# Patient Record
Sex: FEMALE | Race: WHITE | Employment: OTHER | URBAN - METROPOLITAN AREA
[De-identification: names, ages, dates, MRNs, and addresses within clinical notes are randomized per-mention and may not be internally consistent; named-entity substitution may affect disease eponyms.]

---

## 2019-04-09 ENCOUNTER — TELEPHONE (OUTPATIENT)
Dept: SURGERY | Age: 49
End: 2019-04-09

## 2019-04-10 ENCOUNTER — HOSPITAL ENCOUNTER (OUTPATIENT)
Dept: ULTRASOUND IMAGING | Age: 49
Discharge: HOME | End: 2019-04-10
Attending: SURGERY
Payer: COMMERCIAL

## 2019-04-10 ENCOUNTER — HOSPITAL ENCOUNTER (OUTPATIENT)
Dept: MAMMOGRAPHY | Age: 49
Discharge: HOME | End: 2019-04-10
Attending: SURGERY
Payer: COMMERCIAL

## 2019-04-10 ENCOUNTER — OFFICE VISIT (OUTPATIENT)
Dept: SURGERY | Age: 49
End: 2019-04-10

## 2019-04-10 VITALS
RESPIRATION RATE: 16 BRPM | TEMPERATURE: 98 F | SYSTOLIC BLOOD PRESSURE: 107 MMHG | HEART RATE: 77 BPM | DIASTOLIC BLOOD PRESSURE: 75 MMHG | HEIGHT: 64 IN | WEIGHT: 146 LBS | BODY MASS INDEX: 24.92 KG/M2 | OXYGEN SATURATION: 96 %

## 2019-04-10 DIAGNOSIS — N64.52 NIPPLE DISCHARGE: ICD-10-CM

## 2019-04-10 DIAGNOSIS — N64.52 BLOODY DISCHARGE FROM LEFT NIPPLE: Primary | ICD-10-CM

## 2019-04-10 PROCEDURE — 77061 BREAST TOMOSYNTHESIS UNI: CPT

## 2019-04-10 PROCEDURE — 76642 ULTRASOUND BREAST LIMITED: CPT

## 2019-04-10 NOTE — H&P (VIEW-ONLY)
Nipple Discharge Consult Ms. Gm Roger is a 53year old woman who was referred for left nipple discharge. She initially noticed the discharge August 2018. She reports the discharge is bloody. She reports  Spontaneous and induced. It is often brownish, though recently reports it appeared to be chas blood. It happens daily. She denies excessive breast pain, though does report discomfort with her menstrual cycle. She denies history of similar symptoms previously. She is not currently nursing. Her most recent imaging was August 2018 at [address] [place] [address]. Breast/GYN history:   
OB History None Past Medical History:  
Diagnosis Date  Kidney stone Past Surgical History:  
Procedure Laterality Date  HX GYN    
 perineal repair  HX ORTHOPAEDIC    
 left index;plastic surgery Current Outpatient Medications on File Prior to Visit Medication Sig Dispense Refill  
 HYDROcodone-acetaminophen (LORTAB) 5-500 mg per tablet  tamsulosin (FLOMAX) 0.4 mg capsule  oxyCODONE-acetaminophen (PERCOCET) 5-325 mg per tablet Take 1-2 Tabs by mouth every six (6) hours as needed for Pain (Severe). 30 Tab 0  
 norgestimate-ethinyl estradiol (ORTHO TRI-CYCLEN, 28,) 0.18/0.215/0.25 mg-35 mcg (28) tablet Take  by mouth. No current facility-administered medications on file prior to visit. Allergies Allergen Reactions  Hydrocodone Itching Unable to sleep; itchy  Pcn [Penicillins] Rash  
  fever Social History Tobacco Use  Smoking status: Never Smoker  Smokeless tobacco: Never Used Substance Use Topics  Alcohol use: No  
 Drug use: No  
 
 
Family History Problem Relation Age of Onset [facility] Arthritis-osteo Mother  Arthritis-osteo Maternal Grandmother  Diabetes Maternal Grandmother  Cancer Maternal Grandmother   
     breast  
 Breast Cancer Other ROS:  
 General: denies fevers, chills, night sweats, fatigue, weight loss, weight gain GI: denies nausea, vomiting, abdominal pain, change in bowel habits, hematochezia, melena, GERD Integ: denies dermatitis, abnormal moles HEENT: denies visual changes, vertigo, epistaxis, dysphagia, hoarseness Cardiac: denies chest pain, palpitations, HTN, edema, varicosities Resp: denies cough, shortness of breath, wheezing, hemoptysis, snoring, reactive airway disease : denies hematuria, dysuria, frequency, urgency, nocturia, stress urinary incontinence MSK: weakness, joint pain, arthritis Endocrine: denies diabetes, thyroid disease, polyuria, polydipsia, polyphagia, poor wound healing, heat intolerance, cold intolerance Lymph/Heme: denies anemia, bruising, history of blood transfusions Neuro: denies dizziness, headache, fainting, seizures, stroke Psych: denies anxiety, depression Physical Exam: 
Visit Vitals /75 (BP 1 Location: Right arm, BP Patient Position: Sitting) Pulse 77 Temp 98 °F (36.7 °C) (Oral) Resp 16 Ht 5' 4\" (1.626 m) Wt 66.2 kg (146 lb) LMP 04/06/2019 (Exact Date) SpO2 96% BMI 25.06 kg/m² Gen: Well developed, well nourished woman in no acute distress Head: normocephalic, atraumatic Mouth: Clear, no overt lesions, oral mucosa pink and moist 
Neck: supple, no masses, no adenopathy, trachea midline Resp: clear bilaterally Cardio: Regular rate and rhythm Abdomen: soft, nontender, nondistended Extremeties: warm, well-perfused Neuro: sensation and strength grossly intact and symmetrical 
Psych: alert and oriented to person, place and time Breasts:  
Right: Examined in both the supine and upright positions. There was no supraclavicular, infraclavicular, or axillary lympadenopathy. There were no dominant masses, no skin changes, no asymmetry identified Left: Examined in both the supine and upright positions.   There was no supraclavicular, infraclavicular, or axillary lympadenopathy. There were no dominant masses, no skin changes, no asymmetry identified  Induced brown nipple discharge from central duct Imaging: 
August 2018 [facility], negative per patient Impression: 
Patient Active Problem List  
Diagnosis Code  Bloody discharge from left nipple N64.52  
 
 
 53year old woman with left bloody nipple discharge. I have recommended mammogram and possible ultrasound to evaluate. We discussed likely this represents an intraductal papilloma, however I have recommended proceeding with imaging as a fist step. Pending results, image guided biopsy may be recommended. If imaging is negative, we may choose to proceed with retroareolar duct excision for both diagnostic and therapeutic purposes. Ms. Estrada Sim understands and agrees with this plan. Follow up 1 week. Please call sooner with questions or concerns.

## 2019-04-10 NOTE — PROGRESS NOTES
Nipple Discharge Consult    Ms. Gretel Corbett is a 53year old woman who was referred for left nipple discharge. She initially noticed the discharge August 2018. She reports the discharge is bloody. She reports  Spontaneous and induced. It is often brownish, though recently reports it appeared to be chas blood. It happens daily. She denies excessive breast pain, though does report discomfort with her menstrual cycle. She denies history of similar symptoms previously. She is not currently nursing. Her most recent imaging was August 2018 at [address] [place] [address]. Breast/GYN history:    OB History    None          Past Medical History:   Diagnosis Date    Kidney stone        Past Surgical History:   Procedure Laterality Date    HX GYN      perineal repair    HX ORTHOPAEDIC      left index;plastic surgery       Current Outpatient Medications on File Prior to Visit   Medication Sig Dispense Refill    HYDROcodone-acetaminophen (LORTAB) 5-500 mg per tablet       tamsulosin (FLOMAX) 0.4 mg capsule       oxyCODONE-acetaminophen (PERCOCET) 5-325 mg per tablet Take 1-2 Tabs by mouth every six (6) hours as needed for Pain (Severe). 30 Tab 0    norgestimate-ethinyl estradiol (ORTHO TRI-CYCLEN, 28,) 0.18/0.215/0.25 mg-35 mcg (28) tablet Take  by mouth. No current facility-administered medications on file prior to visit.         Allergies   Allergen Reactions    Hydrocodone Itching     Unable to sleep; itchy    Pcn [Penicillins] Rash     fever       Social History     Tobacco Use    Smoking status: Never Smoker    Smokeless tobacco: Never Used   Substance Use Topics    Alcohol use: No    Drug use: No       Family History   Problem Relation Age of Onset    Arthritis-osteo Mother     Arthritis-osteo Maternal Grandmother     Diabetes Maternal Grandmother     Cancer Maternal Grandmother         breast    Breast Cancer Other          ROS:   General: denies fevers, chills, night sweats, fatigue, weight loss, weight gain  GI: denies nausea, vomiting, abdominal pain, change in bowel habits, hematochezia, melena, GERD  Integ: denies dermatitis, abnormal moles  HEENT: denies visual changes, vertigo, epistaxis, dysphagia, hoarseness  Cardiac: denies chest pain, palpitations, HTN, edema, varicosities  Resp: denies cough, shortness of breath, wheezing, hemoptysis, snoring, reactive airway disease  : denies hematuria, dysuria, frequency, urgency, nocturia, stress urinary incontinence MSK: weakness, joint pain, arthritis  Endocrine: denies diabetes, thyroid disease, polyuria, polydipsia, polyphagia, poor wound healing, heat intolerance, cold intolerance  Lymph/Heme: denies anemia, bruising, history of blood transfusions  Neuro: denies dizziness, headache, fainting, seizures, stroke  Psych: denies anxiety, depression    Physical Exam:  Visit Vitals  /75 (BP 1 Location: Right arm, BP Patient Position: Sitting)   Pulse 77   Temp 98 °F (36.7 °C) (Oral)   Resp 16   Ht 5' 4\" (1.626 m)   Wt 66.2 kg (146 lb)   LMP 04/06/2019 (Exact Date)   SpO2 96%   BMI 25.06 kg/m²       Gen: Well developed, well nourished woman in no acute distress  Head: normocephalic, atraumatic  Mouth: Clear, no overt lesions, oral mucosa pink and moist  Neck: supple, no masses, no adenopathy, trachea midline  Resp: clear bilaterally  Cardio: Regular rate and rhythm  Abdomen: soft, nontender, nondistended  Extremeties: warm, well-perfused  Neuro: sensation and strength grossly intact and symmetrical  Psych: alert and oriented to person, place and time  Breasts:   Right: Examined in both the supine and upright positions. There was no supraclavicular, infraclavicular, or axillary lympadenopathy. There were no dominant masses, no skin changes, no asymmetry identified   Left: Examined in both the supine and upright positions. There was no supraclavicular, infraclavicular, or axillary lympadenopathy.    There were no dominant masses, no skin changes, no asymmetry identified  Induced brown nipple discharge from central duct        Imaging:  August 2018 [facility], negative per patient    Impression:  Patient Active Problem List   Diagnosis Code    Bloody discharge from left nipple N64.52        53year old woman with left bloody nipple discharge. I have recommended mammogram and possible ultrasound to evaluate. We discussed likely this represents an intraductal papilloma, however I have recommended proceeding with imaging as a fist step. Pending results, image guided biopsy may be recommended. If imaging is negative, we may choose to proceed with retroareolar duct excision for both diagnostic and therapeutic purposes. Ms. Sarthak Ridley understands and agrees with this plan. Follow up 1 week. Please call sooner with questions or concerns.

## 2019-04-10 NOTE — LETTER
4/10/2019 2:33 PM 
 
Patient:  Richadr Stuart YOB: 1970 Date of Visit: 4/10/2019 Cady Coto MD 
[address] [facility] Internal Medicine [place] [id number] VIA Facsimile: [phone] Edgard Horan MD 
[address] [facility] [place] [address] VIA Facsimile: [phone] Dear MD Edgard Felipe MD, 
 
 
I had the pleasure of seeing Ms. Imer Rivera in my office today for her bloody nipple discharge. I am including a copy of my office visit today. If you have questions, please do not hesitate to call me. I look forward to following Ms. Nicol Walton along with you and will keep you updated as to her progress. Sincerely, Tamir Finn MD

## 2019-04-10 NOTE — PATIENT INSTRUCTIONS
If you have any questions or concerns about today's appointment, the verbal and/or written instructions you were given for follow up care, please call our office at [phone].     [facility] - [address]    [phone] office  [phone]

## 2019-04-11 ENCOUNTER — TELEPHONE (OUTPATIENT)
Dept: SURGERY | Age: 49
End: 2019-04-11

## 2019-04-11 NOTE — TELEPHONE ENCOUNTER
Per Dr. Amanuel Pierce spoke with Mrs. Flavio Cisneros to inform there was no obvious abnormality to explain her nipple discharge on imaging. If she would like to proceed with left retroareolar duct excision to ensure there is nothing obscured by the nipple and most likely stop the bleeding, we may do so. Otherwise she may also discuss the results and treatment plan in further detail with Dr. Amanuel Pierce at her follow up appointment on April 17, 2019. Mrs. Flavio Cisneros states she will discuss with her  and call back because of her kids soccer schedule she'll need to make some arrangements.

## 2019-04-24 ENCOUNTER — OFFICE VISIT (OUTPATIENT)
Dept: VASCULAR SURGERY | Age: 49
End: 2019-04-24

## 2019-04-24 VITALS
SYSTOLIC BLOOD PRESSURE: 104 MMHG | HEART RATE: 60 BPM | BODY MASS INDEX: 24.92 KG/M2 | WEIGHT: 146 LBS | HEIGHT: 64 IN | DIASTOLIC BLOOD PRESSURE: 60 MMHG | RESPIRATION RATE: 16 BRPM

## 2019-04-24 DIAGNOSIS — I83.92 SPIDER VEIN OF LEFT LOWER EXTREMITY: ICD-10-CM

## 2019-04-24 DIAGNOSIS — I87.2 CHRONIC VENOUS INSUFFICIENCY: Primary | ICD-10-CM

## 2019-04-24 NOTE — PROGRESS NOTES
[address] [place] [address]    Chief Complaint   Patient presents with    New Patient    Swelling       HPI    [address] [place] [address] is a 53 y.o. female with previous chronic venous insufficiency that was found 6 years ago within the femoral vein on the left lower extremity. Patient has been on off and on compression socks and stockings. This has allowed her to tolerate some of her issues. But her swelling and discomfort has continued to worsen over time. She now has pretty severe venous claudication at the end of the day with worsening edema at the end of the day. This is improved when she does wear her stockings at times but is unable to wear them all the time. She does have a small spider vein which is visually unappealing but does not cause her any pain or discomfort. No history of DVT or PE. No trauma history. She does have some groin pressure and discomfort which can go into the left lower quadrant of her abdomen. But no pelvic pain or discomfort. She has had 6 pregnancies and 3 to fruition. Her maternal grandmother had very bad chronic venous insufficiency with DVT.   She does not complain of any sexual discomfort    Past Medical History:   Diagnosis Date    Kidney stone     Spider vein of left lower extremity 4/24/2019     Patient Active Problem List   Diagnosis Code    Bloody discharge from left nipple N64.52    Chronic venous insufficiency I87.2    Spider vein of left lower extremity I83.92     Past Surgical History:   Procedure Laterality Date    HX GYN      perineal repair    HX ORTHOPAEDIC      left index;plastic surgery       Allergies   Allergen Reactions    Hydrocodone Itching     Unable to sleep; itchy    Pcn [Penicillins] Rash     fever     Social History     Socioeconomic History    Marital status:      Spouse name: Not on file    Number of children: Not on file    Years of education: Not on file    Highest education level: Not on file   Occupational History    Not on file Social Needs    Financial resource strain: Not on file    Food insecurity:     Worry: Not on file     Inability: Not on file    Transportation needs:     Medical: Not on file     Non-medical: Not on file   Tobacco Use    Smoking status: Never Smoker    Smokeless tobacco: Never Used   Substance and Sexual Activity    Alcohol use: No    Drug use: No    Sexual activity: Yes     Partners: Male     Birth control/protection: Pill   Lifestyle    Physical activity:     Days per week: Not on file     Minutes per session: Not on file    Stress: Not on file   Relationships    Social connections:     Talks on phone: Not on file     Gets together: Not on file     Attends [employer] service: Not on file     Active member of club or organization: Not on file     Attends meetings of clubs or organizations: Not on file     Relationship status: Not on file    Intimate partner violence:     Fear of current or ex partner: Not on file     Emotionally abused: Not on file     Physically abused: Not on file     Forced sexual activity: Not on file   Other Topics Concern    Not on file   Social History Narrative    Not on file      Family History   Problem Relation Age of Onset    Arthritis-osteo Mother     Arthritis-osteo Maternal Grandmother     Diabetes Maternal Grandmother     Cancer Maternal Grandmother         breast    Breast Cancer Other        Review of Systems    Constitutional: negative  Eyes: negative  Ears, nose, mouth, throat, and face: negative  Respiratory: negative  Cardiovascular: negative  Gastrointestinal: negative  Genitourinary:negative  Hematologic/lymphatic: negative  Musculoskeletal:negative  Neurological: negative  Behavioral/Psych: negative  Endocrine: negative  Allergic/Immunologic: negative  Unless otherwise mentioned in the HPI.     Physical Exam:    Visit Vitals  /60 (BP 1 Location: Left arm, BP Patient Position: Sitting)   Pulse 60   Resp 16   Ht 5' 4\" (1.626 m)   Wt 146 lb (66.2 kg) LMP 04/06/2019 (Exact Date)   BMI 25.06 kg/m²      General: Well-appearing female in no acute distress  HEENT: EOMI no scleral icterus is noted moist mucous membranes noted  Pulmonary: No increased work of breathing is noted clear to auscultation bilaterally no wheezes rales or rhonchi  Cardiovascular: Regular rate rhythm normal S1-S2 no rubs murmurs or gallops no carotid bruits are heard bilaterally  Abdomen: Soft nontender nondistended no rebound guarding is noted no palpable pulsatile abdominal mass can be felt  Extremities: Warm and well-perfused bilaterally she does have trace edema of the left lower extremity there is a small spider vein on the left lateral proximal calf. She has no evidence of ulcerations or skin changes. Neuro: Cranial nerves II through XII are grossly intact normal gait    Impression and Plan:  Yasmin Humphreys is a 53 y.o. female with class III chronic venous insufficiency of the left lower extremity with a asymptomatic spider vein. We talked about sclerotherapy for spider vein and pricing. Also talked about having to wear 20 to 30 mmHg compression socks all day every day. We will do this medical therapy for 3 months as well as NSAID S medication and horse chestnut seed extract. I will see her back in 3 months with a venous insufficiency scan. Depending on what this shows will depend on if she needs an ablation or venogram for any possible may Thurner syndrome. Further plan pending the venous insufficiency ultrasound. We reviewed the plan with the patient and the patient understands. We also gave the patient appropriate instructions on their disease process and when to call back. Greater than 50% of this visit was spent with face to face discussion. Follow-up and Dispositions    · Return in about 3 months (around 7/24/2019). Natalie Britton MD    PLEASE NOTE:  This document has been produced using voice recognition software.  Unrecognized errors in transcription may be present.

## 2019-04-24 NOTE — LETTER
4/24/19 Patient: Cody Barker YOB: 1970 Date of Visit: 4/24/2019 Vonnie Mclean MD 
[address] Internal Medicine [facility] [address] [id number] VIA Facsimile: [phone] Dear Vonnie Mclean MD, Thank you for referring Ms. Demetria Arthur to [facility] VEIN/VASCULAR SPEC-PORTS for evaluation. My notes for this consultation are attached. If you have questions, please do not hesitate to call me. I look forward to following your patient along with you. Sincerely, Shana Aguilera MD

## 2019-04-29 ENCOUNTER — ANESTHESIA EVENT (OUTPATIENT)
Dept: SURGERY | Age: 49
End: 2019-04-29
Payer: COMMERCIAL

## 2019-04-29 RX ORDER — BISMUTH SUBSALICYLATE 262 MG
1 TABLET,CHEWABLE ORAL DAILY
COMMUNITY

## 2019-04-29 NOTE — PERIOP NOTES
PAT - SURGICAL PRE-ADMISSION INSTRUCTIONS    NAME:  Yasmin Humphreys                                                          TODAY'S DATE:  4/29/2019    SURGERY DATE:  4/30/2019                                  SURGERY ARRIVAL TIME:   TBV    1. Do NOT eat or drink anything, including candy or gum, after MIDNIGHT on 04/29/2019 , unless you have specific instructions from your Surgeon or Anesthesia Provider to do so. 2. No smoking on the day of surgery. 3. No alcohol 24 hours prior to the day of surgery. 4. No recreational drugs for one week prior to the day of surgery. 5. Leave all valuables, including money/purse, at home. 6. Remove all jewelry, nail polish, makeup (including mascara); no lotions, powders, deodorant, or perfume/cologne/after shave. 7. Glasses/Contact lenses and Dentures may be worn to the hospital.  They will be removed prior to surgery. 8. Call your doctor if symptoms of a cold or illness develop within 24 ours prior to surgery. 9. AN ADULT MUST DRIVE YOU HOME AFTER OUTPATIENT SURGERY. 10. If you are having an OUTPATIENT procedure, please make arrangements for a responsible adult to be with you for 24 hours after your surgery. 11. If you are admitted to the hospital, you will be assigned to a bed after surgery is complete. Normally a family member will not be able to see you until you are in your assigned bed. 15. Family is encouraged to accompany you to the hospital.  We ask visitors in the treatment area to be limited to ONE person at a time to ensure patient privacy. EXCEPTIONS WILL BE MADE AS NEEDED. 15. Children under 12 are discouraged from entering the treatment area and need to be supervised by an adult when in the waiting room. Special Instructions:    NONE. Patient Prep:    shower with anti-bacterial soap    These surgical instructions were reviewed with Facundo Baker during the PAT phone call. Directions:   On the morning of surgery, please go to the [facility]. Enter the building from the [facility] entrance, 1st floor (next to the Emergency Room entrance). Take the elevator to the 2nd floor. Sign in at the Registration Desk.     If you have any questions and/or concerns, please do not hesitate to call:  (Prior to the day of surgery)  [facility] unit:  [phone]  (Day of surgery)  [facility] unit:  [phone]

## 2019-04-30 ENCOUNTER — HOSPITAL ENCOUNTER (OUTPATIENT)
Age: 49
Setting detail: OUTPATIENT SURGERY
Discharge: HOME | End: 2019-04-30
Attending: SURGERY | Admitting: SURGERY
Payer: COMMERCIAL

## 2019-04-30 ENCOUNTER — ANESTHESIA (OUTPATIENT)
Dept: SURGERY | Age: 49
End: 2019-04-30
Payer: COMMERCIAL

## 2019-04-30 VITALS
SYSTOLIC BLOOD PRESSURE: 98 MMHG | RESPIRATION RATE: 16 BRPM | WEIGHT: 146 LBS | DIASTOLIC BLOOD PRESSURE: 56 MMHG | BODY MASS INDEX: 24.32 KG/M2 | OXYGEN SATURATION: 100 % | HEART RATE: 64 BPM | HEIGHT: 65 IN | TEMPERATURE: 97.1 F

## 2019-04-30 DIAGNOSIS — N64.52 BLOODY DISCHARGE FROM LEFT NIPPLE: Primary | ICD-10-CM

## 2019-04-30 LAB — HCG UR QL: NEGATIVE

## 2019-04-30 PROCEDURE — 77030031139 HC SUT VCRL2 J&J -A: Performed by: SURGERY

## 2019-04-30 PROCEDURE — 88305 TISSUE EXAM BY PATHOLOGIST: CPT

## 2019-04-30 PROCEDURE — 77030010516 HC APPL HEMA CLP TELE -B: Performed by: SURGERY

## 2019-04-30 PROCEDURE — 76210000021 HC REC RM PH II 0.5 TO 1 HR: Performed by: SURGERY

## 2019-04-30 PROCEDURE — 74011250636 HC RX REV CODE- 250/636

## 2019-04-30 PROCEDURE — 77030037400 HC ADH TISS HI VISC EXOFIN CHMP -B: Performed by: SURGERY

## 2019-04-30 PROCEDURE — 77030013079 HC BLNKT BAIR HGGR 3M -A: Performed by: ANESTHESIOLOGY

## 2019-04-30 PROCEDURE — 76210000006 HC OR PH I REC 0.5 TO 1 HR: Performed by: SURGERY

## 2019-04-30 PROCEDURE — 88307 TISSUE EXAM BY PATHOLOGIST: CPT

## 2019-04-30 PROCEDURE — 81025 URINE PREGNANCY TEST: CPT

## 2019-04-30 PROCEDURE — 77030032490 HC SLV COMPR SCD KNE COVD -B: Performed by: SURGERY

## 2019-04-30 PROCEDURE — 77030018836 HC SOL IRR NACL ICUM -A: Performed by: SURGERY

## 2019-04-30 PROCEDURE — 74011000250 HC RX REV CODE- 250: Performed by: SURGERY

## 2019-04-30 PROCEDURE — 74011250636 HC RX REV CODE- 250/636: Performed by: NURSE ANESTHETIST, CERTIFIED REGISTERED

## 2019-04-30 PROCEDURE — 76060000032 HC ANESTHESIA 0.5 TO 1 HR: Performed by: SURGERY

## 2019-04-30 PROCEDURE — 77030011265 HC ELECTRD BLD HEX COVD -A: Performed by: SURGERY

## 2019-04-30 PROCEDURE — 77030012510 HC MSK AIRWY LMA TELE -B: Performed by: ANESTHESIOLOGY

## 2019-04-30 PROCEDURE — 74011250637 HC RX REV CODE- 250/637: Performed by: NURSE ANESTHETIST, CERTIFIED REGISTERED

## 2019-04-30 PROCEDURE — 76010000160 HC OR TIME 0.5 TO 1 HR INTENSV-TIER 1: Performed by: SURGERY

## 2019-04-30 PROCEDURE — 77030002996 HC SUT SLK J&J -A: Performed by: SURGERY

## 2019-04-30 PROCEDURE — 77030002933 HC SUT MCRYL J&J -A: Performed by: SURGERY

## 2019-04-30 PROCEDURE — 74011250636 HC RX REV CODE- 250/636: Performed by: SURGERY

## 2019-04-30 PROCEDURE — 77030010938 HC CLP LIG TELE -A: Performed by: SURGERY

## 2019-04-30 RX ORDER — HYDROMORPHONE HYDROCHLORIDE 2 MG/ML
0.5 INJECTION, SOLUTION INTRAMUSCULAR; INTRAVENOUS; SUBCUTANEOUS
Status: DISCONTINUED | OUTPATIENT
Start: 2019-04-30 | End: 2019-04-30 | Stop reason: HOSPADM

## 2019-04-30 RX ORDER — FENTANYL CITRATE 50 UG/ML
INJECTION, SOLUTION INTRAMUSCULAR; INTRAVENOUS AS NEEDED
Status: DISCONTINUED | OUTPATIENT
Start: 2019-04-30 | End: 2019-04-30 | Stop reason: HOSPADM

## 2019-04-30 RX ORDER — LIDOCAINE HYDROCHLORIDE 10 MG/ML
0.1 INJECTION, SOLUTION EPIDURAL; INFILTRATION; INTRACAUDAL; PERINEURAL AS NEEDED
Status: DISCONTINUED | OUTPATIENT
Start: 2019-04-30 | End: 2019-04-30 | Stop reason: HOSPADM

## 2019-04-30 RX ORDER — SODIUM CHLORIDE 0.9 % (FLUSH) 0.9 %
5-40 SYRINGE (ML) INJECTION EVERY 8 HOURS
Status: DISCONTINUED | OUTPATIENT
Start: 2019-04-30 | End: 2019-04-30 | Stop reason: HOSPADM

## 2019-04-30 RX ORDER — LIDOCAINE HYDROCHLORIDE 20 MG/ML
INJECTION, SOLUTION EPIDURAL; INFILTRATION; INTRACAUDAL; PERINEURAL AS NEEDED
Status: DISCONTINUED | OUTPATIENT
Start: 2019-04-30 | End: 2019-04-30 | Stop reason: HOSPADM

## 2019-04-30 RX ORDER — SODIUM CHLORIDE, SODIUM LACTATE, POTASSIUM CHLORIDE, CALCIUM CHLORIDE 600; 310; 30; 20 MG/100ML; MG/100ML; MG/100ML; MG/100ML
50 INJECTION, SOLUTION INTRAVENOUS CONTINUOUS
Status: DISCONTINUED | OUTPATIENT
Start: 2019-04-30 | End: 2019-04-30 | Stop reason: HOSPADM

## 2019-04-30 RX ORDER — ONDANSETRON 2 MG/ML
INJECTION INTRAMUSCULAR; INTRAVENOUS AS NEEDED
Status: DISCONTINUED | OUTPATIENT
Start: 2019-04-30 | End: 2019-04-30 | Stop reason: HOSPADM

## 2019-04-30 RX ORDER — DIPHENHYDRAMINE HYDROCHLORIDE 50 MG/ML
12.5 INJECTION, SOLUTION INTRAMUSCULAR; INTRAVENOUS
Status: DISCONTINUED | OUTPATIENT
Start: 2019-04-30 | End: 2019-04-30 | Stop reason: HOSPADM

## 2019-04-30 RX ORDER — PROPOFOL 10 MG/ML
INJECTION, EMULSION INTRAVENOUS AS NEEDED
Status: DISCONTINUED | OUTPATIENT
Start: 2019-04-30 | End: 2019-04-30 | Stop reason: HOSPADM

## 2019-04-30 RX ORDER — SODIUM CHLORIDE, SODIUM LACTATE, POTASSIUM CHLORIDE, CALCIUM CHLORIDE 600; 310; 30; 20 MG/100ML; MG/100ML; MG/100ML; MG/100ML
100 INJECTION, SOLUTION INTRAVENOUS CONTINUOUS
Status: DISCONTINUED | OUTPATIENT
Start: 2019-04-30 | End: 2019-04-30 | Stop reason: HOSPADM

## 2019-04-30 RX ORDER — HYDROMORPHONE HYDROCHLORIDE 2 MG/ML
0.2 INJECTION, SOLUTION INTRAMUSCULAR; INTRAVENOUS; SUBCUTANEOUS AS NEEDED
Status: DISCONTINUED | OUTPATIENT
Start: 2019-04-30 | End: 2019-04-30 | Stop reason: HOSPADM

## 2019-04-30 RX ORDER — DEXAMETHASONE SODIUM PHOSPHATE 4 MG/ML
INJECTION, SOLUTION INTRA-ARTICULAR; INTRALESIONAL; INTRAMUSCULAR; INTRAVENOUS; SOFT TISSUE AS NEEDED
Status: DISCONTINUED | OUTPATIENT
Start: 2019-04-30 | End: 2019-04-30 | Stop reason: HOSPADM

## 2019-04-30 RX ORDER — FAMOTIDINE 20 MG/1
20 TABLET, FILM COATED ORAL ONCE
Status: COMPLETED | OUTPATIENT
Start: 2019-04-30 | End: 2019-04-30

## 2019-04-30 RX ORDER — INSULIN LISPRO 100 [IU]/ML
INJECTION, SOLUTION INTRAVENOUS; SUBCUTANEOUS ONCE
Status: DISCONTINUED | OUTPATIENT
Start: 2019-04-30 | End: 2019-04-30 | Stop reason: HOSPADM

## 2019-04-30 RX ORDER — BUPIVACAINE HYDROCHLORIDE AND EPINEPHRINE 5; 5 MG/ML; UG/ML
INJECTION, SOLUTION EPIDURAL; INTRACAUDAL; PERINEURAL AS NEEDED
Status: DISCONTINUED | OUTPATIENT
Start: 2019-04-30 | End: 2019-04-30 | Stop reason: HOSPADM

## 2019-04-30 RX ORDER — MAGNESIUM SULFATE 100 %
4 CRYSTALS MISCELLANEOUS AS NEEDED
Status: DISCONTINUED | OUTPATIENT
Start: 2019-04-30 | End: 2019-04-30 | Stop reason: HOSPADM

## 2019-04-30 RX ORDER — TRAMADOL HYDROCHLORIDE 50 MG/1
50 TABLET ORAL
Qty: 5 TAB | Refills: 0 | Status: SHIPPED | OUTPATIENT
Start: 2019-04-30 | End: 2019-05-03

## 2019-04-30 RX ORDER — SODIUM CHLORIDE 0.9 % (FLUSH) 0.9 %
5-40 SYRINGE (ML) INJECTION AS NEEDED
Status: DISCONTINUED | OUTPATIENT
Start: 2019-04-30 | End: 2019-04-30 | Stop reason: HOSPADM

## 2019-04-30 RX ORDER — OXYCODONE AND ACETAMINOPHEN 5; 325 MG/1; MG/1
1 TABLET ORAL AS NEEDED
Status: DISCONTINUED | OUTPATIENT
Start: 2019-04-30 | End: 2019-04-30 | Stop reason: HOSPADM

## 2019-04-30 RX ORDER — DEXTROSE MONOHYDRATE 25 G/50ML
25-50 INJECTION, SOLUTION INTRAVENOUS AS NEEDED
Status: DISCONTINUED | OUTPATIENT
Start: 2019-04-30 | End: 2019-04-30 | Stop reason: HOSPADM

## 2019-04-30 RX ORDER — MIDAZOLAM HYDROCHLORIDE 1 MG/ML
INJECTION, SOLUTION INTRAMUSCULAR; INTRAVENOUS AS NEEDED
Status: DISCONTINUED | OUTPATIENT
Start: 2019-04-30 | End: 2019-04-30 | Stop reason: HOSPADM

## 2019-04-30 RX ORDER — DIPHENHYDRAMINE HYDROCHLORIDE 50 MG/ML
INJECTION, SOLUTION INTRAMUSCULAR; INTRAVENOUS AS NEEDED
Status: DISCONTINUED | OUTPATIENT
Start: 2019-04-30 | End: 2019-04-30 | Stop reason: HOSPADM

## 2019-04-30 RX ADMIN — FAMOTIDINE 20 MG: 20 TABLET, FILM COATED ORAL at 09:41

## 2019-04-30 RX ADMIN — DEXAMETHASONE SODIUM PHOSPHATE 4 MG: 4 INJECTION, SOLUTION INTRA-ARTICULAR; INTRALESIONAL; INTRAMUSCULAR; INTRAVENOUS; SOFT TISSUE at 10:21

## 2019-04-30 RX ADMIN — ONDANSETRON 4 MG: 2 INJECTION INTRAMUSCULAR; INTRAVENOUS at 10:41

## 2019-04-30 RX ADMIN — FENTANYL CITRATE 50 MCG: 50 INJECTION, SOLUTION INTRAMUSCULAR; INTRAVENOUS at 10:12

## 2019-04-30 RX ADMIN — DIPHENHYDRAMINE HYDROCHLORIDE 25 MG: 50 INJECTION, SOLUTION INTRAMUSCULAR; INTRAVENOUS at 11:05

## 2019-04-30 RX ADMIN — PROPOFOL 150 MG: 10 INJECTION, EMULSION INTRAVENOUS at 10:17

## 2019-04-30 RX ADMIN — SODIUM CHLORIDE, SODIUM LACTATE, POTASSIUM CHLORIDE, AND CALCIUM CHLORIDE 50 ML/HR: 600; 310; 30; 20 INJECTION, SOLUTION INTRAVENOUS at 09:57

## 2019-04-30 RX ADMIN — LIDOCAINE HYDROCHLORIDE 100 MG: 20 INJECTION, SOLUTION EPIDURAL; INFILTRATION; INTRACAUDAL; PERINEURAL at 10:17

## 2019-04-30 RX ADMIN — MIDAZOLAM HYDROCHLORIDE 2 MG: 1 INJECTION, SOLUTION INTRAMUSCULAR; INTRAVENOUS at 10:12

## 2019-04-30 RX ADMIN — SODIUM CHLORIDE 1000 MG: 900 INJECTION, SOLUTION INTRAVENOUS at 09:57

## 2019-04-30 NOTE — INTERVAL H&P NOTE
H&P Update:  Suha De La Rosa was seen and examined. History and physical has been reviewed. The patient has been examined. There have been no significant clinical changes since the completion of the originally dated History and Physical.  Patient identified by surgeon; surgical site was confirmed by patient and surgeon. Patient requesting Tramodol for pain postoperatively.  Allergies noted, confirmed patient has taken and tolerated this or similar medication in the past and this is her choice for narcotic pain medication.  We discussed tylenol and ibuprofen may be sufficient, assuming she has not been told to avoid either medication (generally due to concerns for kidneys, stomach or liver). I have advised her to limit acetaminophen from all sources to less than 4,000mg per day and not to drive while taking narcotic pain medication. No driving while taking narcotic pain medication.  I have recommended taking narcotic pain medication sparingly and only if needed.  If using the narcotic pain medication, I have recommended taking with food.  We discussed this class of medication can constipate, so I have encouraged use of Miralax or Milk of Magnesia if constipation occurs. This class of medication can also be addicting. Again I recommend taking narcotic pain medication sparingly and only if needed.

## 2019-04-30 NOTE — OP NOTES
Date of Procedure: 4/30/2019  Preoperative Diagnosis: Bloody Nipple Discharge (L) Nipple N64.52  Postoperative Diagnosis: Bloody Nipple Discharge (L) Nipple N64.52  Procedure(s):  Procedure(s):  Left Retroareolar Duct Excision    Surgeon(s) and Role:      Shirley Esqueda MD - Primary         Surgical Staff: Circ-1: Marsha Rodriguez  Circ-2: Meera Gan  Scrub Tech-1: Estuardo Walls  Surg Asst-1: Dortha Ed      Event Time In     Event Time In Time Out   Incision Start 1024    Incision Close       Event Time In Time Out   Patient In - Facility (Arrived) 0825    Patient In - Pre-op 6079    Anesthesia [id number]    Patient Ready for OR 2631    Surgeon Available     Patient Out - Pre-op     Patient In - OR 1012    Surgeon In [address]    Incision Start 1024    Incision Close     Surgeon out of OR 1039    Patient Out - OR     Anesthesia Stop     Patient In - Phase I     Notice to Anesthesia     Care Complete - Phase I     Patient Out - Phase I     Patient In - Phase II     Patient Tolerates Liquids     Patient Ready for Visitors     Patient Education Complete     Patient Voided     Care Complete - Phase II     Patient Out - Phase II     End of Periop Care     Patient In - Overflow     Patient Out - Overflow         Anesthesia: General  Anesthesia staff: Anesthesiologist: Jyotsna Holt MD  CRNA: Glendy Duarte CRNA  Estimated Blood Loss: 2cc  Specimens:   ID Type Source Tests Collected by Time Destination   1 : Left breast mass long lateral short superior tan anterior Preservative Breast  Ray Wells MD 4/30/2019 1035 Pathology        Findings: dilated retroareolar duct   Complications: none noted  Implants: * No implants in log *     The patient was identified in the preoperative holding area and the risks again were reviewed with the patient who understands and agrees.  She understands the risk of bleeding, infection, damage to surrounding structures, hematoma/seroma formation, and the possibility of missing the lesion in question. She also understands additional surgery may be indicated. She was marked by me to confirm the site and the procedure. The patient was taken to the operating suite and placed supine on the table. Compression stockings were placed and anesthesia induced without complication. She was then prepped and draped in the usual sterile fashion and an appropriate time-out was performed to confirm the patient, the side, and the procedure. Local anesthetic was infiltrated. An incision was made at the lower outer circumareoalr position in the left breast. We then dissected into the subcutaneous tissue of the breast posterior to the nipple areolar complex. We then used electrocautery to remove a core of breast tissue posterior to the nipple. An enlarged duct was noted and ligated. There was excellent hemostasis and the specimen was marked for orientation on removal of the tissue. A clip was placed to fazal the site. The breast tissue was mobilized to close the 1.5x1.5cm defect with 2-0 vicryl. All sponge and instrument counts were reported to me as correct. We continued the closure with a 3-0 vicryl suture, followed by 4-0 monocryl suture. Dermabond was then applied. The family was updated after the procedure. The patient was taken to recovery in good condition.

## 2019-04-30 NOTE — DISCHARGE INSTRUCTIONS
Patient Education        Open Breast Biopsy: What to Expect at Home  Your Recovery  An open breast biopsy is surgery to take a sample of breast tissue. A breast biopsy may be done to check a lump found during a breast exam. Or it may be done to check an area of concern found on a mammogram or ultrasound. The breast tissue will be sent to a lab, where a doctor will look at the tissue under a microscope to check for breast cancer. Your doctor may have some answers right away. But it can take up to 1 to 2 weeks to get the final results. Your doctor will discuss the results with you. For a few days after the surgery, you will probably feel tired and have some pain. The skin around the cut (incision) may feel firm, swollen, and tender. The area may be bruised. Tenderness usually goes away in a few days, and the bruising within 2 weeks. Firmness and swelling may take 3 to 6 months to go away. The stitches in your incision may dissolve on their own, or the doctor may take them out 7 to 10 days after surgery. This care sheet gives you a general idea about how long it will take for you to recover. But each person recovers at a different pace. Follow the steps below to get better as quickly as possible. How can you care for yourself at home? Activity    · Rest when you feel tired. Getting enough sleep will help you recover.     · Try to walk each day. Start by walking a little more than you did the day before. Bit by bit, increase the amount you walk. Walking boosts blood flow and helps prevent pneumonia and constipation.     · For 2 weeks, avoid strenuous activities that put pressure on your chest or that involve vigorous movement of your upper body and arm on the side of the biopsy. Examples of these might include strenuous housework, holding an active child, jogging, or aerobic exercise.     · For 2 weeks, avoid lifting anything that would make you strain.  This may include heavy grocery bags and milk containers, a heavy briefcase or backpack, cat litter or dog food bags, a vacuum , or a child.     · Ask your doctor when you can drive again.     · You will probably need to take 1 or 2 days off from work. This depends on the type of work you do and how you feel. Diet    · You can eat your normal diet. If your stomach is upset, try bland, low-fat foods like plain rice, broiled chicken, toast, and yogurt. Medicines    · Your doctor will tell you if and when you can restart your medicines. He or she will also give you instructions about taking any new medicines.     · If you take blood thinners, such as warfarin (Coumadin), clopidogrel (Plavix), or aspirin, be sure to talk to your doctor. He or she will tell you if and when to start taking those medicines again. Make sure that you understand exactly what your doctor wants you to do.     · Be safe with medicines. Take pain medicines exactly as directed. ? If the doctor gave you a prescription medicine for pain, take it as prescribed. ? If you are not taking a prescription pain medicine, ask your doctor if you can take an over-the-counter medicine.     · If you think your pain medicine is making you sick to your stomach:  ? Take your medicine after meals (unless your doctor has told you not to). ? Ask your doctor for a different pain medicine.     · If your doctor prescribed antibiotics, take them as directed. Do not stop taking them just because you feel better. You need to take the full course of antibiotics. Incision care    · If you have strips of tape on the incision, leave the tape on for a week or until it falls off.     · Wash the area daily with warm, soapy water, and pat it dry. Don't use hydrogen peroxide or alcohol, which can slow healing. You may cover the area with a gauze bandage if it weeps or rubs against clothing. Change the bandage every day.     · Keep the area clean and dry.    Other instructions    · For the first 3 days after surgery, wear a supportive bra all the time, even at night. Follow-up care is a key part of your treatment and safety. Be sure to make and go to all appointments, and call your doctor if you are having problems. It's also a good idea to know your test results and keep a list of the medicines you take. When should you call for help? Call 911 anytime you think you may need emergency care. For example, call if:    · You passed out (lost consciousness).     · You have chest pain, are short of breath, or cough up blood.    Call your doctor now or seek immediate medical care if:    · You are sick to your stomach or cannot drink fluids.     · You have pain that does not get better after you take pain medicine.     · You cannot pass stools or gas.     · You have signs of a blood clot in your leg (called a deep vein thrombosis), such as:  ? Pain in your calf, back of the knee, thigh, or groin. ? Redness or swelling in your leg.     · You have signs of infection, such as:  ? Increased pain, swelling, warmth, or redness. ? Red streaks leading from the incision. ? Pus draining from the incision. ? A fever.     · You have loose stitches, or your incision comes open.     · Bright red blood has soaked through the bandage over your incision.    Watch closely for changes in your health, and be sure to contact your doctor if:    · You have any problems.     · You have new or worse swelling or pain in your arm. Where can you learn more? Go to http://celi-erwin.info/. Enter R444 in the search box to learn more about \"Open Breast Biopsy: What to Expect at Home. \"  Current as of: March 27, 2018  Content Version: 11.9  © 5320-5533 [employer]. Care instructions adapted under license by [employer] (which disclaims liability or warranty for this information).  If you have questions about a medical condition or this instruction, always ask your healthcare professional. Srinivasa Pitt disclaims any warranty or liability for your use of this information. Patient armband removed and given to patient to take home. Patient was informed of the privacy risks if armband lost or stolen    DISCHARGE SUMMARY from Nurse    PATIENT INSTRUCTIONS:    After general anesthesia or intravenous sedation, for 24 hours or while taking prescription Narcotics:  · Limit your activities  · Do not drive and operate hazardous machinery  · Do not make important personal or business decisions  · Do  not drink alcoholic beverages  · If you have not urinated within 8 hours after discharge, please contact your surgeon on call. Report the following to your surgeon:  · Excessive pain, swelling, redness or odor of or around the surgical area  · Temperature over 100.5  · Nausea and vomiting lasting longer than 4 hours or if unable to take medications  · Any signs of decreased circulation or nerve impairment to extremity: change in color, persistent  numbness, tingling, coldness or increase pain  · Any questions      These are general instructions for a healthy lifestyle:    No smoking/ No tobacco products/ Avoid exposure to second hand smoke  Surgeon General's Warning:  Quitting smoking now greatly reduces serious risk to your health. Obesity, smoking, and sedentary lifestyle greatly increases your risk for illness    A healthy diet, regular physical exercise & weight monitoring are important for maintaining a healthy lifestyle    You may be retaining fluid if you have a history of heart failure or if you experience any of the following symptoms:  Weight gain of 3 pounds or more overnight or 5 pounds in a week, increased swelling in our hands or feet or shortness of breath while lying flat in bed. Please call your doctor as soon as you notice any of these symptoms; do not wait until your next office visit.     Recognize signs and symptoms of STROKE:    F-face looks uneven    A-arms unable to move or move unevenly    S-speech slurred or non-existent    T-time-call 911 as soon as signs and symptoms begin-DO NOT go       Back to bed or wait to see if you get better-TIME IS BRAIN. Warning Signs of HEART ATTACK     Call 911 if you have these symptoms:   Chest discomfort. Most heart attacks involve discomfort in the center of the chest that lasts more than a few minutes, or that goes away and comes back. It can feel like uncomfortable pressure, squeezing, fullness, or pain.  Discomfort in other areas of the upper body. Symptoms can include pain or discomfort in one or both arms, the back, neck, jaw, or stomach.  Shortness of breath with or without chest discomfort.  Other signs may include breaking out in a cold sweat, nausea, or lightheadedness. Don't wait more than five minutes to call 911 - MINUTES MATTER! Fast action can save your life. Calling 911 is almost always the fastest way to get lifesaving treatment. Emergency Medical Services staff can begin treatment when they arrive -- up to an hour sooner than if someone gets to the hospital by car. The discharge information has been reviewed with the patient. The patient verbalized understanding. Discharge medications reviewed with the patient and appropriate educational materials and side effects teaching were provided.   ___________________________________________________________________________________________________________________________________

## 2019-04-30 NOTE — PERIOP NOTES
Pre-Op Summary    Pt arrived via car with family/friend and is oriented to time, place, person and situation. Patient with steady gait with none assistive devices. Visit Vitals  /69 (BP 1 Location: Left arm, BP Patient Position: At rest)   Pulse 74   Temp 97.5 °F (36.4 °C)   Resp 16   Ht 5' 4.5\" (1.638 m)   Wt 66.2 kg (146 lb)   LMP 04/06/2019 (Exact Date)   SpO2 98%   BMI 24.67 kg/m²       Peripheral IV located on Right hand . Patients belongings are located given to . Patient's point of contact is Karen Dye and their contact number is: [phone]. They will be in the waiting room. They are able to receive medication information. They will be their ride home.

## 2019-04-30 NOTE — ANESTHESIA POSTPROCEDURE EVALUATION
Procedure(s):  Left Retroareolar Duct Excision. general    Anesthesia Post Evaluation      Multimodal analgesia: multimodal analgesia used between 6 hours prior to anesthesia start to PACU discharge  Patient location during evaluation: bedside  Patient participation: complete - patient participated  Level of consciousness: awake  Pain management: adequate  Airway patency: patent  Anesthetic complications: no  Cardiovascular status: acceptable  Respiratory status: acceptable  Hydration status: acceptable  Post anesthesia nausea and vomiting:  none      Vitals Value Taken Time   BP 98/65 4/30/2019 11:33 AM   Temp 36.2 °C (97.1 °F) 4/30/2019 11:08 AM   Pulse 62 4/30/2019 11:42 AM   Resp 13 4/30/2019 11:42 AM   SpO2 98 % 4/30/2019 11:43 AM   Vitals shown include unvalidated device data.

## 2019-05-06 ENCOUNTER — OFFICE VISIT (OUTPATIENT)
Dept: SURGERY | Age: 49
End: 2019-05-06

## 2019-05-06 VITALS
SYSTOLIC BLOOD PRESSURE: 113 MMHG | TEMPERATURE: 97.2 F | RESPIRATION RATE: 16 BRPM | DIASTOLIC BLOOD PRESSURE: 78 MMHG | HEIGHT: 65 IN | WEIGHT: 149 LBS | OXYGEN SATURATION: 97 % | BODY MASS INDEX: 24.83 KG/M2 | HEART RATE: 80 BPM

## 2019-05-06 DIAGNOSIS — N64.52 BLOODY DISCHARGE FROM LEFT NIPPLE: ICD-10-CM

## 2019-05-06 DIAGNOSIS — D24.2 INTRADUCTAL PAPILLOMA OF BREAST, LEFT: Primary | ICD-10-CM

## 2019-05-06 NOTE — LETTER
5/6/2019 9:46 AM 
 
Patient:  Yfn Bertrand YOB: 1970 Date of Visit: 5/6/2019 Rozina Abrams MD 
[address] [facility] [address] [id number] VIA Facsimile: [phone] Dear Rozina Abrams MD, 
 
 
I had the pleasure of seeing Ms. Gabriela Monique in my office today for follow up after excision of intraductal papilloma. I am including a copy of my office visit today. If you have questions, please do not hesitate to call me. I look forward to following Ms. Lizzie Olivares along with you and will keep you updated as to her progress. Sincerely, Anastacia Murillo MD

## 2019-05-06 NOTE — PATIENT INSTRUCTIONS
If you have any questions or concerns about today's appointment, the verbal and/or written instructions you were given for follow up care, please call our office at [phone].     [facility] - [address]    [phone] office  [phone]

## 2019-05-06 NOTE — PROGRESS NOTES
1. Have you been to the ER, urgent care clinic since your last visit? Hospitalized since your last visit? No    2. Have you seen or consulted any other health care providers outside of the [address] [place] [address] since your last visit? Include any pap smears or colon screening. No     Patient presents for post op from ex bx of left breast on 4/30/19.

## 2019-05-06 NOTE — PROGRESS NOTES
Nipple Discharge     Ms. Maday Teixeira is a 53year old woman who was referred for left nipple discharge. She initially noticed the discharge August 2018. She reports the discharge is bloody. She reports  Spontaneous and induced. It is often brownish, though recently reports it appeared to be chas blood. It happens daily. She denies excessive breast pain, though does report discomfort with her menstrual cycle. She denies history of similar symptoms previously. She is not currently nursing. Her most recent imaging was August 2018 at [address] [place] [address]. Excision 4/30/19 demonstrated intraductal papilloma. Over the past day, she has noted some bleeding from the incision. Breast/GYN history:    OB History    None          Past Medical History:   Diagnosis Date    Kidney stone     Spider vein of left lower extremity 4/24/2019       Past Surgical History:   Procedure Laterality Date    HX BREAST LUMPECTOMY Left 4/30/2019    Left Retroareolar Duct Excision performed by Darrell Henderson MD at [facility] MA[facility] OR    HX GYN      perineal repair    HX HEENT Bilateral     eye    HX ORTHOPAEDIC      left index;plastic surgery       Current Outpatient Medications on File Prior to Visit   Medication Sig Dispense Refill    multivitamin (ONE A DAY) tablet Take 1 Tab by mouth daily.  omega 3-dha-epa-fish oil (FISH OIL) 100-160-1,000 mg cap Take  by mouth. No current facility-administered medications on file prior to visit.         Allergies   Allergen Reactions    Hydrocodone Itching     Unable to sleep; itchy    Pcn [Penicillins] Rash     fever       Social History     Tobacco Use    Smoking status: Never Smoker    Smokeless tobacco: Never Used   Substance Use Topics    Alcohol use: Yes     Comment: occasional    Drug use: No       Family History   Problem Relation Age of Onset    Arthritis-osteo Mother     Arthritis-osteo Maternal Grandmother     Diabetes Maternal Grandmother     Cancer Maternal Grandmother         breast    Breast Cancer Other          ROS:   General: denies fevers, chills, night sweats, fatigue, weight loss, weight gain  GI: denies nausea, vomiting, abdominal pain, change in bowel habits, hematochezia, melena, GERD  Integ: denies dermatitis, abnormal moles  HEENT: denies visual changes, vertigo, epistaxis, dysphagia, hoarseness  Cardiac: denies chest pain, palpitations, HTN, edema, varicosities  Resp: denies cough, shortness of breath, wheezing, hemoptysis, snoring, reactive airway disease  : denies hematuria, dysuria, frequency, urgency, nocturia, stress urinary incontinence MSK: weakness, joint pain, arthritis  Endocrine: denies diabetes, thyroid disease, polyuria, polydipsia, polyphagia, poor wound healing, heat intolerance, cold intolerance  Lymph/Heme: denies anemia, bruising, history of blood transfusions  Neuro: denies dizziness, headache, fainting, seizures, stroke  Psych: denies anxiety, depression    Physical Exam:  Visit Vitals  /78 (BP 1 Location: Right arm, BP Patient Position: Sitting)   Pulse 80   Temp 97.2 °F (36.2 °C) (Oral)   Resp 16   Ht 5' 4.5\" (1.638 m)   Wt 67.6 kg (149 lb)   LMP 05/05/2019 (Exact Date)   SpO2 97%   BMI 25.18 kg/m²       Gen: Well developed, well nourished woman in no acute distress  Head: normocephalic, atraumatic  Mouth: Clear, no overt lesions, oral mucosa pink and moist  Neck: supple, no masses, no adenopathy, trachea midline  Resp: clear bilaterally  Cardio: Regular rate and rhythm  Abdomen: soft, nontender, nondistended  Extremeties: warm, well-perfused  Neuro: sensation and strength grossly intact and symmetrical  Psych: alert and oriented to person, place and time  Breasts: palpation deferred today  Right: Examined in both the supine and upright positions. There was no supraclavicular, infraclavicular, or axillary lympadenopathy.    There were no dominant masses, no skin changes, no asymmetry identified   Left: Examined in both the supine and upright positions. There was no supraclavicular, infraclavicular, or axillary lympadenopathy. There were no dominant masses, no skin changes, no asymmetry identified  Incision clean, ecchymosis, blood noted on cape, not actively bleeding during exam       Imaging:  August 2018 [facility], negative per patient    Impression:  Patient Active Problem List   Diagnosis Code    Bloody discharge from left nipple N64.52    Chronic venous insufficiency I87.2    Spider vein of left lower extremity I83.92    Intraductal papilloma of breast, left D24.2        53year old woman with left bloody nipple discharge, s/p excision of intraductal papilloma. We discussed the pathology results of intraductal papilloma in detail. We discussed that they are not malignant. They may cause mass and/or bloody nipple discharge. As she has had excision, she should not experience either. I have reinforced the incision with steristrips and recommended she wear a sports bra for compression and support. Follow up 1 week for site check. She was asked to call sooner with questions or concerns.

## 2019-05-07 ENCOUNTER — TELEPHONE (OUTPATIENT)
Dept: SURGERY | Age: 49
End: 2019-05-07

## 2019-05-07 NOTE — TELEPHONE ENCOUNTER
Received call from patient requesting Dr. Sunday Betancur order estrogen and progesterone and testosterone labs for her. Patient states that she was taking herbal supplements that she feels may have affected her hormone levels. Explained to patient that Dr. Edwardo Bello role as a surgeon may limit the labs she will order as she would not treat a hormone issue. Patient does not wish to go through GYN for labs as it will incur a copay. Will contact Dr. Sunday Betancur for recommendations. Patient reports that bleeding from incision site has all but stopped. She had 2 pin hole sized drops of blood on gauze this morning.

## 2019-05-07 NOTE — TELEPHONE ENCOUNTER
Attempted to return patient's call, however there was no answer. VM box was full, unable to leave [facility].

## 2019-05-07 NOTE — TELEPHONE ENCOUNTER
Patient returned call to the office. Explained to patient that per Dr. Fawn Blevins she would advise having hormonal labs obtained by either GYN or PCP as they would be in the position to treat any possible abnormality. Patient verbalized understanding.

## 2019-05-12 NOTE — ANESTHESIA PREPROCEDURE EVALUATION
Relevant Problems   No relevant active problems       Anesthetic History   No history of anesthetic complications            Review of Systems / Medical History  Patient summary reviewed, nursing notes reviewed and pertinent labs reviewed    Pulmonary  Within defined limits                 Neuro/Psych   Within defined limits           Cardiovascular  Within defined limits                Exercise tolerance: >4 METS     GI/Hepatic/Renal  Within defined limits              Endo/Other  Within defined limits           Other Findings              Physical Exam    Airway  Mallampati: I  TM Distance: 4 - 6 cm  Neck ROM: normal range of motion   Mouth opening: Normal     Cardiovascular  Regular rate and rhythm,  S1 and S2 normal,  no murmur, click, rub, or gallop  Rhythm: regular  Rate: normal         Dental    Dentition: Caps/crowns     Pulmonary  Breath sounds clear to auscultation               Abdominal  GI exam deferred       Other Findings            Anesthetic Plan    ASA: 1  Anesthesia type: general            Anesthetic plan and risks discussed with: Patient
Imaging Studies/EKG

## 2019-05-13 ENCOUNTER — OFFICE VISIT (OUTPATIENT)
Dept: SURGERY | Age: 49
End: 2019-05-13

## 2019-05-13 VITALS
RESPIRATION RATE: 18 BRPM | BODY MASS INDEX: 24.66 KG/M2 | TEMPERATURE: 97 F | DIASTOLIC BLOOD PRESSURE: 77 MMHG | SYSTOLIC BLOOD PRESSURE: 111 MMHG | WEIGHT: 148 LBS | OXYGEN SATURATION: 95 % | HEART RATE: 86 BPM | HEIGHT: 65 IN

## 2019-05-13 DIAGNOSIS — D24.2 INTRADUCTAL PAPILLOMA OF BREAST, LEFT: Primary | ICD-10-CM

## 2019-05-13 NOTE — PROGRESS NOTES
1. Have you been to the ER, urgent care clinic since your last visit? Hospitalized since your last visit? No    2. Have you seen or consulted any other health care providers outside of the [address] [place] [address] since your last visit? Include any pap smears or colon screening. No     Patient presents for post op from left retroareolar duct excision. She reports that site is healing well, and she is no longer bleeding.

## 2019-05-13 NOTE — PATIENT INSTRUCTIONS
If you have any questions or concerns about today's appointment, the verbal and/or written instructions you were given for follow up care, please call our office at [phone].     [facility] - [address]    [phone] office  [phone]

## 2019-05-13 NOTE — PROGRESS NOTES
Nipple Discharge     Ms. Margaret Gupta is a 53year old woman who was referred for left nipple discharge. She initially noticed the discharge August 2018. She reports the discharge is bloody. She reports  Spontaneous and induced. It is often brownish, though recently reports it appeared to be chas blood. It happens daily. She denies excessive breast pain, though does report discomfort with her menstrual cycle. She denies history of similar symptoms previously. She is not currently nursing. Her most recent imaging was August 2018 at [address] [place] [address]. Excision 4/30/19 demonstrated intraductal papilloma. She is overall doing well and is without complaint. Breast/GYN history:    OB History    None          Past Medical History:   Diagnosis Date    Kidney stone     Spider vein of left lower extremity 4/24/2019       Past Surgical History:   Procedure Laterality Date    HX BREAST LUMPECTOMY Left 4/30/2019    Left Retroareolar Duct Excision performed by Diana An MD at [facility] MAIN OR    HX GYN      perineal repair    HX HEENT Bilateral     eye    HX ORTHOPAEDIC      left index;plastic surgery       Current Outpatient Medications on File Prior to Visit   Medication Sig Dispense Refill    multivitamin (ONE A DAY) tablet Take 1 Tab by mouth daily.  omega 3-dha-epa-fish oil (FISH OIL) 100-160-1,000 mg cap Take  by mouth. No current facility-administered medications on file prior to visit.         Allergies   Allergen Reactions    Hydrocodone Itching     Unable to sleep; itchy    Pcn [Penicillins] Rash     fever       Social History     Tobacco Use    Smoking status: Never Smoker    Smokeless tobacco: Never Used   Substance Use Topics    Alcohol use: Yes     Comment: occasional    Drug use: No       Family History   Problem Relation Age of Onset    Arthritis-osteo Mother     Arthritis-osteo Maternal Grandmother     Diabetes Maternal Grandmother     Cancer Maternal Grandmother breast    Breast Cancer Other          ROS:   General: denies fevers, chills, night sweats, fatigue, weight loss, weight gain  GI: denies nausea, vomiting, abdominal pain, change in bowel habits, hematochezia, melena, GERD  Integ: denies dermatitis, abnormal moles  HEENT: denies visual changes, vertigo, epistaxis, dysphagia, hoarseness  Cardiac: denies chest pain, palpitations, HTN, edema, varicosities  Resp: denies cough, shortness of breath, wheezing, hemoptysis, snoring, reactive airway disease  : denies hematuria, dysuria, frequency, urgency, nocturia, stress urinary incontinence MSK: weakness, joint pain, arthritis  Endocrine: denies diabetes, thyroid disease, polyuria, polydipsia, polyphagia, poor wound healing, heat intolerance, cold intolerance  Lymph/Heme: denies anemia, bruising, history of blood transfusions  Neuro: denies dizziness, headache, fainting, seizures, stroke  Psych: denies anxiety, depression    Physical Exam:  Visit Vitals  /77 (BP 1 Location: Right arm, BP Patient Position: Sitting)   Pulse 86   Temp 97 °F (36.1 °C) (Oral)   Resp 18   Ht 5' 4.5\" (1.638 m)   Wt 67.1 kg (148 lb)   LMP 05/05/2019 (Exact Date)   SpO2 95%   BMI 25.01 kg/m²       Gen: Well developed, well nourished woman in no acute distress  Head: normocephalic, atraumatic  Mouth: Clear, no overt lesions, oral mucosa pink and moist  Neck: supple, no masses, no adenopathy, trachea midline  Resp: clear bilaterally  Cardio: Regular rate and rhythm  Abdomen: soft, nontender, nondistended  Extremeties: warm, well-perfused  Neuro: sensation and strength grossly intact and symmetrical  Psych: alert and oriented to person, place and time  Breasts: palpation deferred today  Right: Examined in both the supine and upright positions. There was no supraclavicular, infraclavicular, or axillary lympadenopathy.    There were no dominant masses, no skin changes, no asymmetry identified   Left: Examined in both the supine and upright positions. There was no supraclavicular, infraclavicular, or axillary lympadenopathy. There were no dominant masses, no skin changes, no asymmetry identified  Incision clean, healing well       Imaging:  August 2018 [facility], negative per patient    Impression:  Patient Active Problem List   Diagnosis Code    Bloody discharge from left nipple N64.52    Chronic venous insufficiency I87.2    Spider vein of left lower extremity I83.92    Intraductal papilloma of breast, left D24.2        53year old woman with left bloody nipple discharge, s/p excision of intraductal papilloma. I have recommended annual clinical breast exam and annual mammogram.  She is due for bilateral mammogram August 2019. I have not scheduled any additional follow up. She was asked to call with questions or concerns.

## 2019-05-13 NOTE — LETTER
5/13/2019 9:18 AM 
 
Patient:  Laureen Hamm YOB: 1970 Date of Visit: 5/13/2019 Oliver Matute MD 
[address] [facility] [place] [id number] VIA Facsimile: [phone] Tasia Marcelo MD 
[facility] [address] [facility] [address] [id number] VIA Facsimile: [phone] Dear MD Tasia Ellison MD, 
 
 
I had the pleasure of seeing Ms. Emery Carmona in my office today after excision of left breast intraductal papilloma. She is thrilled with her result. I am including a copy of my office visit today. If you have questions, please do not hesitate to call me. Sincerely, Radha Murray MD

## 2019-07-29 ENCOUNTER — OFFICE VISIT (OUTPATIENT)
Dept: VASCULAR SURGERY | Age: 49
End: 2019-07-29

## 2019-07-29 VITALS
WEIGHT: 148 LBS | BODY MASS INDEX: 24.66 KG/M2 | HEIGHT: 65 IN | RESPIRATION RATE: 20 BRPM | DIASTOLIC BLOOD PRESSURE: 78 MMHG | HEART RATE: 72 BPM | OXYGEN SATURATION: 98 % | SYSTOLIC BLOOD PRESSURE: 102 MMHG

## 2019-07-29 DIAGNOSIS — I87.2 CHRONIC VENOUS INSUFFICIENCY: Primary | ICD-10-CM

## 2019-07-29 NOTE — LETTER
7/29/19 Patient: Joe Vigil YOB: 1970 Date of Visit: 7/29/2019 Jay Ayala MD 
[address] [facility] [address] [id number] VIA Facsimile: [phone] Dear Jay Ayala MD, Thank you for referring Ms. Darcy Adamson to [facility] for evaluation. My notes for this consultation are attached. If you have questions, please do not hesitate to call me. I look forward to following your patient along with you. Sincerely, Margot Braun MD

## 2019-07-29 NOTE — PROGRESS NOTES
[address] [place] [address]    Chief Complaint   Patient presents with    Swelling       History and Physical    Suha Lh Edwardo Rivera is a 53 y.o. female with class III chronic venous insufficiency of the left lower extremity. Patient continues to have swelling and discomfort of the left lower extremity. She does complain of venous claudication. No varicose veins in today's visit. No ulcerations or shortness of breath. Past Medical History:   Diagnosis Date    Kidney stone     Spider vein of left lower extremity 4/24/2019     Past Surgical History:   Procedure Laterality Date    HX BREAST LUMPECTOMY Left 4/30/2019    Left Retroareolar Duct Excision performed by Yuriy Washington MD at [facility] MA[facility] OR    HX GYN      perineal repair    HX HEENT Bilateral     eye    HX ORTHOPAEDIC      left index;plastic surgery     Patient Active Problem List   Diagnosis Code    Bloody discharge from left nipple N64.52    Chronic venous insufficiency I87.2    Spider vein of left lower extremity I83.92    Intraductal papilloma of breast, left D24.2     Current Outpatient Medications   Medication Sig Dispense Refill    multivitamin (ONE A DAY) tablet Take 1 Tab by mouth daily.  omega 3-dha-epa-fish oil (FISH OIL) 100-160-1,000 mg cap Take  by mouth.        Allergies   Allergen Reactions    Hydrocodone Itching     Unable to sleep; itchy    Pcn [Penicillins] Rash     fever     Social History     Socioeconomic History    Marital status:      Spouse name: Not on file    Number of children: Not on file    Years of education: Not on file    Highest education level: Not on file   Occupational History    Not on file   Social Needs    Financial resource strain: Not on file    Food insecurity:     Worry: Not on file     Inability: Not on file    Transportation needs:     Medical: Not on file     Non-medical: Not on file   Tobacco Use    Smoking status: Never Smoker    Smokeless tobacco: Never Used   Substance and Sexual Activity    Alcohol use: Yes     Comment: occasional    Drug use: No    Sexual activity: Yes     Partners: Male     Birth control/protection: Pill   Lifestyle    Physical activity:     Days per week: Not on file     Minutes per session: Not on file    Stress: Not on file   Relationships    Social connections:     Talks on phone: Not on file     Gets together: Not on file     Attends [employer] service: Not on file     Active member of club or organization: Not on file     Attends meetings of clubs or organizations: Not on file     Relationship status: Not on file    Intimate partner violence:     Fear of current or ex partner: Not on file     Emotionally abused: Not on file     Physically abused: Not on file     Forced sexual activity: Not on file   Other Topics Concern    Not on file   Social History Narrative    Not on file      Family History   Problem Relation Age of Onset    Arthritis-osteo Mother     Arthritis-osteo Maternal Grandmother     Diabetes Maternal Grandmother     Cancer Maternal Grandmother         breast    Breast Cancer Other        Physical Exam:    Visit Vitals  /78 (BP 1 Location: Left arm, BP Patient Position: Sitting)   Pulse 72   Resp 20   Ht 5' 4.5\" (1.638 m)   Wt 148 lb (67.1 kg)   SpO2 98%   BMI 25.01 kg/m²      General: Well-appearing female in no acute distress  HEENT: EOMI no scleral icterus is noted  Extremities: Warm and perfused bilaterally trace edema is noted within bilateral lower extremities no ulcerations are identified no skin changes are identified and no varicose veins are identified bilaterally  Neuro: Cranial nerves II through XII are grossly intact  Pulmonary: No increased work of breathing is noted    Impression and Plan:  Jarett Sosa is a 53 y.o. female with class III chronic venous insufficiency of the left lower extremity.   Patient was recommended for venography given her ultrasound showing no DVT and deep venous reflux within bilateral common femoral veins as well as the femoral vein on the left lower extremity. There is a chance that she could have some May Thurner syndrome. I did tell her that there is about a 10 to 20% chance that she could be positive. If she is positive we would recommend stenting at the time of the procedure. Patient is understanding of all the risks and benefits of the procedure including but not limited to bleeding, infection, damage to adjacent structures, MI, stroke, death, DVT, loss of lower extremity, need for further surgery. Patient is understanding of all the risks and is willing to move forward with the procedure. We reviewed the plan with the patient and the patient understands. We also gave the patient appropriate instructions on their disease process and when to call back. Greater than 50% of this visit was spent with face to face discussion. Abram Flores MD    PLEASE NOTE:  This document has been produced using voice recognition software. Unrecognized errors in transcription may be present.

## 2019-07-29 NOTE — H&P (VIEW-ONLY)
[address] [place] [address]    Chief Complaint   Patient presents with    Swelling       History and Physical    Suha Chacorta Wilder Akhtar is a 53 y.o. female with class III chronic venous insufficiency of the left lower extremity. Patient continues to have swelling and discomfort of the left lower extremity. She does complain of venous claudication. No varicose veins in today's visit. No ulcerations or shortness of breath. Past Medical History:   Diagnosis Date    Kidney stone     Spider vein of left lower extremity 4/24/2019     Past Surgical History:   Procedure Laterality Date    HX BREAST LUMPECTOMY Left 4/30/2019    Left Retroareolar Duct Excision performed by Luz Aldrich [facility] at [facility] MA[facility] OR    HX GYN      perineal repair    HX HEENT Bilateral     eye    HX ORTHOPAEDIC      left index;plastic surgery     Patient Active Problem List   Diagnosis Code    Bloody discharge from left nipple N64.52    Chronic venous insufficiency I87.2    Spider vein of left lower extremity I83.92    Intraductal papilloma of breast, left D24.2     Current Outpatient Medications   Medication Sig Dispense Refill    multivitamin (ONE A DAY) tablet Take 1 Tab by mouth daily.  omega 3-dha-epa-fish oil (FISH OIL) 100-160-1,000 mg cap Take  by mouth.        Allergies   Allergen Reactions    Hydrocodone Itching     Unable to sleep; itchy    Pcn [Penicillins] Rash     fever     Social History     Socioeconomic History    Marital status:      Spouse name: Not on file    Number of children: Not on file    Years of education: Not on file    Highest education level: Not on file   Occupational History    Not on file   Social Needs    Financial resource strain: Not on file    Food insecurity:     Worry: Not on file     Inability: Not on file    Transportation needs:     Medical: Not on file     Non-medical: Not on file   Tobacco Use    Smoking status: Never Smoker    Smokeless tobacco: Never Used   Substance and Sexual Activity    Alcohol use: Yes     Comment: occasional    Drug use: No    Sexual activity: Yes     Partners: Male     Birth control/protection: Pill   Lifestyle    Physical activity:     Days per week: Not on file     Minutes per session: Not on file    Stress: Not on file   Relationships    Social connections:     Talks on phone: Not on file     Gets together: Not on file     Attends [employer] service: Not on file     Active member of club or organization: Not on file     Attends meetings of clubs or organizations: Not on file     Relationship status: Not on file    Intimate partner violence:     Fear of current or ex partner: Not on file     Emotionally abused: Not on file     Physically abused: Not on file     Forced sexual activity: Not on file   Other Topics Concern    Not on file   Social History Narrative    Not on file      Family History   Problem Relation Age of Onset    Arthritis-osteo Mother     Arthritis-osteo Maternal Grandmother     Diabetes Maternal Grandmother     Cancer Maternal Grandmother         breast    Breast Cancer Other        Physical Exam:    Visit Vitals  /78 (BP 1 Location: Left arm, BP Patient Position: Sitting)   Pulse 72   Resp 20   Ht 5' 4.5\" (1.638 m)   Wt 148 lb (67.1 kg)   SpO2 98%   BMI 25.01 kg/m²      General: Well-appearing female in no acute distress  HEENT: EOMI no scleral icterus is noted  Extremities: Warm and perfused bilaterally trace edema is noted within bilateral lower extremities no ulcerations are identified no skin changes are identified and no varicose veins are identified bilaterally  Neuro: Cranial nerves II through XII are grossly intact  Pulmonary: No increased work of breathing is noted    Impression and Plan:  Delfino Vicente is a 53 y.o. female with class III chronic venous insufficiency of the left lower extremity.   Patient was recommended for venography given her ultrasound showing no DVT and deep venous reflux within bilateral common femoral veins as well as the femoral vein on the left lower extremity. There is a chance that she could have some May Thurner syndrome. I did tell her that there is about a 10 to 20% chance that she could be positive. If she is positive we would recommend stenting at the time of the procedure. Patient is understanding of all the risks and benefits of the procedure including but not limited to bleeding, infection, damage to adjacent structures, MI, stroke, death, DVT, loss of lower extremity, need for further surgery. Patient is understanding of all the risks and is willing to move forward with the procedure. We reviewed the plan with the patient and the patient understands. We also gave the patient appropriate instructions on their disease process and when to call back. Greater than 50% of this visit was spent with face to face discussion. Tim Camacho MD    PLEASE NOTE:  This document has been produced using voice recognition software. Unrecognized errors in transcription may be present.

## 2019-07-29 NOTE — PROGRESS NOTES
1. Have you been to the ER, urgent care clinic since your last visit? Hospitalized since your last visit? No    2. Have you seen or consulted any other health care providers outside of the [address] [place] [address] since your last visit? Include any pap smears or colon screening.  No       3 most recent PHQ Screens 7/29/2019   Little interest or pleasure in doing things Not at all   Feeling down, depressed, irritable, or hopeless Not at all   Total Score PHQ 2 0

## 2019-08-12 ENCOUNTER — HOSPITAL ENCOUNTER (OUTPATIENT)
Dept: PREADMISSION TESTING | Age: 49
Discharge: HOME | End: 2019-08-12

## 2019-08-12 ENCOUNTER — HOSPITAL ENCOUNTER (OUTPATIENT)
Dept: LAB | Age: 49
Discharge: HOME | End: 2019-08-12

## 2019-08-12 LAB — SENTARA SPECIMEN COL,SENBCF: NORMAL

## 2019-08-12 PROCEDURE — 99001 SPECIMEN HANDLING PT-LAB: CPT

## 2019-08-15 ENCOUNTER — HOSPITAL ENCOUNTER (OUTPATIENT)
Age: 49
Setting detail: OUTPATIENT SURGERY
Discharge: HOME | End: 2019-08-15
Attending: SURGERY | Admitting: SURGERY
Payer: COMMERCIAL

## 2019-08-15 VITALS
DIASTOLIC BLOOD PRESSURE: 67 MMHG | RESPIRATION RATE: 17 BRPM | HEIGHT: 64 IN | TEMPERATURE: 98.4 F | SYSTOLIC BLOOD PRESSURE: 99 MMHG | BODY MASS INDEX: 25.27 KG/M2 | OXYGEN SATURATION: 97 % | HEART RATE: 84 BPM | WEIGHT: 148 LBS

## 2019-08-15 DIAGNOSIS — I87.2 PERIPHERAL VENOUS INSUFFICIENCY: ICD-10-CM

## 2019-08-15 DIAGNOSIS — I87.2 CHRONIC VENOUS INSUFFICIENCY: Primary | ICD-10-CM

## 2019-08-15 LAB — HCG UR QL: NEGATIVE

## 2019-08-15 PROCEDURE — 74011250637 HC RX REV CODE- 250/637: Performed by: SURGERY

## 2019-08-15 PROCEDURE — C1753 CATH, INTRAVAS ULTRASOUND: HCPCS | Performed by: SURGERY

## 2019-08-15 PROCEDURE — 75820 VEIN X-RAY ARM/LEG: CPT | Performed by: SURGERY

## 2019-08-15 PROCEDURE — C1725 CATH, TRANSLUMIN NON-LASER: HCPCS | Performed by: SURGERY

## 2019-08-15 PROCEDURE — 37252 INTRVASC US NONCORONARY 1ST: CPT | Performed by: SURGERY

## 2019-08-15 PROCEDURE — 75825 VEIN X-RAY TRUNK: CPT | Performed by: SURGERY

## 2019-08-15 PROCEDURE — 37238 OPEN/PERQ PLACE STENT SAME: CPT | Performed by: SURGERY

## 2019-08-15 PROCEDURE — 99152 MOD SED SAME PHYS/QHP 5/>YRS: CPT | Performed by: SURGERY

## 2019-08-15 PROCEDURE — 36005 INJECTION EXT VENOGRAPHY: CPT

## 2019-08-15 PROCEDURE — 74011250636 HC RX REV CODE- 250/636

## 2019-08-15 PROCEDURE — 81025 URINE PREGNANCY TEST: CPT

## 2019-08-15 PROCEDURE — 74011636320 HC RX REV CODE- 636/320: Performed by: SURGERY

## 2019-08-15 PROCEDURE — 77030020269 HC MISC IMPL: Performed by: SURGERY

## 2019-08-15 PROCEDURE — 74011250636 HC RX REV CODE- 250/636: Performed by: SURGERY

## 2019-08-15 PROCEDURE — 77030013744: Performed by: SURGERY

## 2019-08-15 PROCEDURE — 99153 MOD SED SAME PHYS/QHP EA: CPT | Performed by: SURGERY

## 2019-08-15 PROCEDURE — 76937 US GUIDE VASCULAR ACCESS: CPT | Performed by: SURGERY

## 2019-08-15 PROCEDURE — C1894 INTRO/SHEATH, NON-LASER: HCPCS | Performed by: SURGERY

## 2019-08-15 PROCEDURE — C2625 STENT, NON-COR, TEM W/DEL SY: HCPCS | Performed by: SURGERY

## 2019-08-15 DEVICE — VENOVO® VENOUS STENT SYSTEM 16 MM X 60 MM (80 CM DELIVERY CATHETER)
Type: IMPLANTABLE DEVICE | Status: FUNCTIONAL
Brand: VENOVO®

## 2019-08-15 RX ORDER — ASPIRIN 81 MG/1
81 TABLET ORAL DAILY
Qty: 90 TAB | Status: SHIPPED | OUTPATIENT
Start: 2019-08-15

## 2019-08-15 RX ORDER — HEPARIN SODIUM 200 [USP'U]/100ML
INJECTION, SOLUTION INTRAVENOUS
Status: COMPLETED | OUTPATIENT
Start: 2019-08-15 | End: 2019-08-15

## 2019-08-15 RX ORDER — SODIUM CHLORIDE 0.9 % (FLUSH) 0.9 %
5-40 SYRINGE (ML) INJECTION AS NEEDED
Status: DISCONTINUED | OUTPATIENT
Start: 2019-08-15 | End: 2019-08-15 | Stop reason: HOSPADM

## 2019-08-15 RX ORDER — LIDOCAINE HYDROCHLORIDE 10 MG/ML
INJECTION, SOLUTION EPIDURAL; INFILTRATION; INTRACAUDAL; PERINEURAL AS NEEDED
Status: DISCONTINUED | OUTPATIENT
Start: 2019-08-15 | End: 2019-08-15 | Stop reason: HOSPADM

## 2019-08-15 RX ORDER — MIDAZOLAM HYDROCHLORIDE 1 MG/ML
INJECTION, SOLUTION INTRAMUSCULAR; INTRAVENOUS AS NEEDED
Status: DISCONTINUED | OUTPATIENT
Start: 2019-08-15 | End: 2019-08-15 | Stop reason: HOSPADM

## 2019-08-15 RX ORDER — HEPARIN SODIUM 1000 [USP'U]/ML
INJECTION, SOLUTION INTRAVENOUS; SUBCUTANEOUS AS NEEDED
Status: DISCONTINUED | OUTPATIENT
Start: 2019-08-15 | End: 2019-08-15 | Stop reason: HOSPADM

## 2019-08-15 RX ORDER — SODIUM CHLORIDE 0.9 % (FLUSH) 0.9 %
5-40 SYRINGE (ML) INJECTION EVERY 8 HOURS
Status: DISCONTINUED | OUTPATIENT
Start: 2019-08-15 | End: 2019-08-15 | Stop reason: HOSPADM

## 2019-08-15 RX ORDER — FENTANYL CITRATE 50 UG/ML
INJECTION, SOLUTION INTRAMUSCULAR; INTRAVENOUS AS NEEDED
Status: DISCONTINUED | OUTPATIENT
Start: 2019-08-15 | End: 2019-08-15 | Stop reason: HOSPADM

## 2019-08-15 RX ORDER — OXYCODONE AND ACETAMINOPHEN 5; 325 MG/1; MG/1
1 TABLET ORAL
Qty: 20 TAB | Refills: 0 | Status: SHIPPED | OUTPATIENT
Start: 2019-08-15 | End: 2019-08-20

## 2019-08-15 RX ORDER — SODIUM CHLORIDE 9 MG/ML
INJECTION, SOLUTION INTRAVENOUS
Status: COMPLETED | OUTPATIENT
Start: 2019-08-15 | End: 2019-08-15

## 2019-08-15 RX ORDER — OXYCODONE AND ACETAMINOPHEN 5; 325 MG/1; MG/1
1 TABLET ORAL
Status: COMPLETED | OUTPATIENT
Start: 2019-08-15 | End: 2019-08-15

## 2019-08-15 RX ADMIN — OXYCODONE HYDROCHLORIDE AND ACETAMINOPHEN 1 TABLET: 5; 325 TABLET ORAL at 15:22

## 2019-08-15 NOTE — PROGRESS NOTES
08/15/19 1206   Vitals   Temp 98.4 °F (36.9 °C)   Temp Source Oral   Pulse (Heart Rate) 82   Heart Rate Source Monitor   Resp Rate 18   O2 Sat (%) 98 %   Level of Consciousness Alert   /64   MAP (Monitor) 90   MAP (Calculated) 79   BP 1 Location Left arm   BP 1 Method Automatic   BP Patient Position At rest   Cardiac Rhythm NSR   MEWS Score 1   Pt AXO, 4 brought to [facility] ambulatory. Pt placed in bed 1and connected to monitor. Baseline VS taken and PIV started x 1. Admission database completed. Pt ready for ordered procedure.

## 2019-08-15 NOTE — Clinical Note
TRANSFER - OUT REPORT:  
 
Verbal report given to: Cherylene Hale RN. Report consisted of patient's Situation, Background, Assessment and  
Recommendations(SBAR). Opportunity for questions and clarification was provided. Patient transported with a Cardiac Cath Tech / Patient Care Tech. Patient transported to: [address].

## 2019-08-15 NOTE — DISCHARGE INSTRUCTIONS
Patient Education        Learning About Venous Insufficiency  What is it? Venous insufficiency is a problem with the flow of blood from the veins of the legs back to the heart. It's also called chronic venous insufficiency or chronic venous stasis. Your veins bring blood back to the heart after it flows through your body. Veins have valves that keep the blood moving in one direction--toward the heart. But with venous insufficiency, the veins of the legs might not work as they should. This can allow blood to leak backward. Fluid can pool in the legs. This can lead to problems that include varicose veins. What causes it? Venous insufficiency is sometimes caused by deep vein thrombosis and high blood pressure inside leg veins. You are more likely to have venous insufficiency if you:  · Are older. · Are female. · Are overweight. · Don't get enough physical activity. · Smoke. · Have a family history of varicose veins. What are the symptoms? Symptoms of venous insufficiency affect the legs. They may include:  · Swelling, often in the ankles. · A rash. · Varicose veins. · Itching. · Cramping. · Skin sores (ulcers). · Aching or a feeling of heaviness. · Changes in skin color. How is it diagnosed? Your doctor can diagnose venous insufficiency by examining your legs and by using a type of ultrasound test (duplex Doppler) to find out how well blood is flowing in your legs. How is it treated? To reduce swelling and relieve pain caused by venous insufficiency, you can wear compression stockings. They are tighter at the ankles than at the top of the legs. They also can help venous skin ulcers heal. But there are different types of stockings, and they need to fit right. So your doctor will recommend what you need. You also can try to:  · Get more exercise, especially walking. It can increase blood flow. · Avoid standing still or sitting for a long time, which can make the fluid pool in your legs.   · Try not to sit with your legs crossed at the knee. · Keep your legs raised above your heart when you're lying down. This reduces swelling. If these treatments don't work, you may need medicine or a procedure to help relieve symptoms. Procedures might be done to close the vein, to remove the vein, or to improve blood flow. Follow-up care is a key part of your treatment and safety. Be sure to make and go to all appointments, and call your doctor if you are having problems. It's also a good idea to know your test results and keep a list of the medicines you take. Current as of: September 26, 2018  Content Version: 12.1  © 3421-7788 [employer]. Care instructions adapted under license by [employer] (which disclaims liability or warranty for this information). If you have questions about a medical condition or this instruction, always ask your healthcare professional. [place] [address] any warranty or liability for your use of this information. PERIPHERAL ANGIOGRAPHY DISCHARGE INSTRUCTIONS    1. Check puncture site frequently for swelling or bleeding. If there is any bleeding, lie down and apply pressure over the area with a clean towel or washcloth. Notify your doctor for any redness, swelling, drainage, or oozing from the puncture site. Notify your doctor for any fever or chills. 2. If the extremity becomes cold, numb, or painful call [phone] for assistance. 3. Activity should be limited for the next 48 hours. Climb stairs as little as possible and avoid any stooping, bending, or strenuous activity for 48 hours. No heavy lifting (anything over 10 pounds) for 3 days. 4. You may resume your usual diet. Drink more fluids than usual.  5. Have a responsible person drive you home and stay with you for at least 24 hours after your heart catheterization/angiography. 6. You may remove bandage from your Left and Groin in 24 hours. You may shower in 24 hours.  No tub baths, hot tubs, or swimming for 1 week. Do not place any lotions, creams, powders, or ointments over puncture site for 1 week. You may place a clean band-aid over the puncture site each day for 5 days. Change daily. Your Recovery  Your groin or leg may have a bruise or a small lump where the catheter was put in your groin. The area may feel sore for a day or two after the procedure. You can do light activities around the house but nothing strenuous for several days. After surgery, blood may flow better throughout your leg, which can decrease leg pain, numbness, and cramping. This care sheet gives you a general idea about how long it will take for you to recover. But each person recovers at a different pace. Follow the steps below to feel better as quickly as possible. How can you care for yourself at home? Activity  · Do not do strenuous exercise and do not lift anything heavy until your doctor says it is okay. This may be for a day or two. You can walk around the house and do light activity, such as cooking. · You may shower 24 to 48 hours after the procedure, if your doctor okays it. Pat the incision dry. Do not take a bath for 1 week, or until your doctor tells you it is okay. · Go back to regular exercise when your doctor says it is okay. Walking is a good choice. · If you work, you will probably need to take 1 or 2 days off. It depends on the type of work you do and how you feel. Diet  · You can eat your normal diet. · Drink plenty of fluids (unless your doctor tells you not to). Medicines  · Your doctor will tell you if and when you can restart your medicines. He or she will also give you instructions about taking any new medicines. · If you take blood thinners, such as warfarin (Coumadin), clopidogrel (Plavix), or aspirin, be sure to talk to your doctor. He or she will tell you if and when to start taking those medicines again.  Make sure that you understand exactly what your doctor wants you to do.  · Be safe with medicines. Take your medicines exactly as prescribed. Call your doctor if you think you are having a problem with your medicine. · Your doctor may prescribe a blood thinner when you go home. This helps prevent blood clots. Be sure you get instructions about how to take your medicine safely. Blood thinners can cause serious bleeding problems. Care of the catheter site  · Keep a bandage over the spot where the catheter was inserted for the first day, or for as long as your doctor recommends. · Put ice or a cold pack on the area for 10 to 20 minutes at a time to help with soreness or swelling. Do this every few hours. Put a thin cloth between the ice and your skin. Follow-up care is a key part of your treatment and safety. Be sure to make and go to all appointments, and call your doctor if you are having problems. It's also a good idea to know your test results and keep a list of the medicines you take. Sedation for a Medical Procedure: Care Instructions  Your Care Instructions  For a minor procedure or surgery, you will get a sedative to help you relax. This drug will make you sleepy. It is usually given in a vein (by IV). A shot may also be used to numb the area. If you had local anesthesia, you may feel some pain and discomfort as it wears off. If you have pain, don't be afraid to say so. Pain medicine works better if you take it before the pain gets bad. Common side effects from sedation include:  · Feeling sleepy. (Your doctors and nurses will make sure you are not too sleepy to go home.)  · Nausea and vomiting. This usually does not last long. · Feeling tired. Follow-up care is a key part of your treatment and safety. Be sure to make and go to all appointments, and call your doctor if you are having problems. It's also a good idea to know your test results and keep a list of the medicines you take. How can you care for yourself at home?   Activity  · Don't do anything for 24 hours that requires attention to detail. It takes time for the medicine effects to completely wear off. · For your safety, you should not drive or operate any machinery that could be dangerous until the medicine wears off and you can think clearly and react easily. · Rest when you feel tired. Getting enough sleep will help you recover. Diet  · You can eat your normal diet, unless your doctor gives you other instructions. If your stomach is upset, try clear liquids and bland, low-fat foods like plain toast or rice. · Drink plenty of fluids (unless your doctor tells you not to). · Don't drink alcohol for 24 hours. Medicines  · Be safe with medicines. Read and follow all instructions on the label. ¨ If the doctor gave you a prescription medicine for pain, take it as prescribed. ¨ If you are not taking a prescription pain medicine, ask your doctor if you can take an over-the-counter medicine. · If you think your pain medicine is making you sick to your stomach:  ¨ Take your medicine after meals (unless your doctor has told you not to). ¨ Ask your doctor for a different pain medicine. When should you call for help? Call 911 anytime you think you may need emergency care. For example, call if:  · You passed out (lost consciousness). · You have severe trouble breathing. · You have sudden chest pain and shortness of breath, or you cough up blood. · Your groin is very swollen and you have a lump that is getting bigger under your skin where the catheter was put in. Call your doctor now or seek immediate medical care if:  · You have severe pain in your leg, or it becomes cold, pale, blue, tingly, or numb. · You are bleeding from the area where the catheter was put in your artery. · You have a fast-growing, painful lump at the catheter site. · You have signs of infection, such as:  ¨ Increased pain, swelling, warmth, or redness of the skin. ¨ Red streaks leading from the area. ¨ Pus draining from the area.   ¨ A fever.    These are general instructions for a healthy lifestyle:    No smoking/ No tobacco products/ Avoid exposure to second hand smoke    Surgeon General's Warning:  Quitting smoking now greatly reduces serious risk to your health. Obesity, smoking, and sedentary lifestyle greatly increases your risk for illness    A healthy diet, regular physical exercise & weight monitoring are important for maintaining a healthy lifestyle    You may be retaining fluid if you have a history of heart failure or if you experience any of the following symptoms:  Weight gain of 3 pounds or more overnight or 5 pounds in a week, increased swelling in our hands or feet or shortness of breath while lying flat in bed. Please call your doctor as soon as you notice any of these symptoms; do not wait until your next office visit. Recognize signs and symptoms of STROKE:    F-face looks uneven    A-arms unable to move or move unevenly    S-speech slurred or non-existent    T-time-call 911 as soon as signs and symptoms begin-DO NOT go       Back to bed or wait to see if you get better-TIME IS BRAIN. Warning Signs of HEART ATTACK     Call 911 if you have these symptoms:   Chest discomfort. Most heart attacks involve discomfort in the center of the chest that lasts more than a few minutes, or that goes away and comes back. It can feel like uncomfortable pressure, squeezing, fullness, or pain.  Discomfort in other areas of the upper body. Symptoms can include pain or discomfort in one or both arms, the back, neck, jaw, or stomach.  Shortness of breath with or without chest discomfort.  Other signs may include breaking out in a cold sweat, nausea, or lightheadedness. Don't wait more than five minutes to call 911 - MINUTES MATTER! Fast action can save your life. Calling 911 is almost always the fastest way to get lifesaving treatment.  Emergency Medical Services staff can begin treatment when they arrive -- up to an hour sooner than if someone gets to the hospital by car. The discharge information has been reviewed with the patient. The patient verbalized understanding. Discharge medications reviewed with the patient and appropriate educational materials and side effects teaching were provided.

## 2019-08-15 NOTE — Clinical Note
Bilateral groin clipped, prepped with ChloraPrep and draped. Wet prep solution applied at: 1343. Wet prep solution dried at: 1346. Wet prep elapsed drying time: 3 mins.

## 2019-08-15 NOTE — INTERVAL H&P NOTE
H&P Update:  Suha Whatley Dell Richardson was seen and examined. History and physical has been reviewed. The patient has been examined.  There have been no significant clinical changes since the completion of the originally dated History and Physical.

## 2019-08-15 NOTE — Clinical Note
TRANSFER - IN REPORT:  
 
Verbal report received from: Ava Sam RN. Report consisted of patient's Situation, Background, Assessment and  
Recommendations(SBAR). Opportunity for questions and clarification was provided. Assessment completed upon patient's arrival to unit and care assumed. Patient transported with a Cardiac Cath Tech / Patient Care Tech.

## 2019-08-22 ENCOUNTER — TELEPHONE (OUTPATIENT)
Dept: VASCULAR SURGERY | Age: 49
End: 2019-08-22

## 2019-08-22 DIAGNOSIS — I87.1 MAY-THURNER SYNDROME: Primary | ICD-10-CM

## 2019-08-22 DIAGNOSIS — R60.1 GENERALIZED EDEMA: ICD-10-CM

## 2019-08-22 NOTE — TELEPHONE ENCOUNTER
----- Message from [address] [place] [address] sent at 8/22/2019  8:59 AM EDT -----  Patient would like to speak with a nurse concerning significant pain from her surgery done on 08/15. States the pain is worse now than a week ago. I asked if she wanted to come in today to see Carlie Britt, but she declined to see a PA. Please call her on the [phone].   Thanks

## 2019-08-28 ENCOUNTER — OFFICE VISIT (OUTPATIENT)
Dept: VASCULAR SURGERY | Age: 49
End: 2019-08-28

## 2019-08-28 ENCOUNTER — TELEPHONE (OUTPATIENT)
Dept: VASCULAR SURGERY | Age: 49
End: 2019-08-28

## 2019-08-28 VITALS
DIASTOLIC BLOOD PRESSURE: 70 MMHG | SYSTOLIC BLOOD PRESSURE: 122 MMHG | HEART RATE: 80 BPM | HEIGHT: 64 IN | WEIGHT: 148 LBS | RESPIRATION RATE: 16 BRPM | BODY MASS INDEX: 25.27 KG/M2

## 2019-08-28 DIAGNOSIS — I87.2 CHRONIC VENOUS INSUFFICIENCY: ICD-10-CM

## 2019-08-28 DIAGNOSIS — I83.92 SPIDER VEIN OF LEFT LOWER EXTREMITY: ICD-10-CM

## 2019-08-28 DIAGNOSIS — I87.1 MAY-THURNER SYNDROME: Primary | ICD-10-CM

## 2019-08-28 NOTE — PROGRESS NOTES
[address] [place] [address]    Chief Complaint   Patient presents with    Surgical Follow-up       History and Physical    [address] [place] [address] is a 53 y.o. female who recently underwent left common iliac vein stenting for May Thurner syndrome. Patient is having a lot of discomfort at her stent site. But overall states that her left leg is no longer as swollen as it was and feels improved. Unfortunately she still has a lot of back discomfort and pelvic discomfort likely secondary to the stent. But this seems to be improving over the past couple weeks. No fevers or chills. Past Medical History:   Diagnosis Date    Kidney stone     Spider vein of left lower extremity 4/24/2019     Past Surgical History:   Procedure Laterality Date    HX BREAST LUMPECTOMY Left 4/30/2019    Left Retroareolar Duct Excision performed by Eva [facility] at [facility] MAIN OR    HX GYN      perineal repair    HX HEENT Bilateral     eye    HX ORTHOPAEDIC      left index;plastic surgery     Patient Active Problem List   Diagnosis Code    Bloody discharge from left nipple N64.52    Chronic venous insufficiency I87.2    Spider vein of left lower extremity I83.92    Intraductal papilloma of breast, left D24.2     Current Outpatient Medications   Medication Sig Dispense Refill    aspirin delayed-release 81 mg tablet Take 1 Tab by mouth daily. 90 Tab prn    multivitamin (ONE A DAY) tablet Take 1 Tab by mouth daily.  omega 3-dha-epa-fish oil (FISH OIL) 100-160-1,000 mg cap Take  by mouth.        Allergies   Allergen Reactions    Hydrocodone Itching     Unable to sleep; itchy    Pcn [Penicillins] Rash     fever     Social History     Socioeconomic History    Marital status:      Spouse name: Not on file    Number of children: Not on file    Years of education: Not on file    Highest education level: Not on file   Occupational History    Not on file   Social Needs    Financial resource strain: Not on file   [place] insecurity:     Worry: Not on file     Inability: Not on file    Transportation needs:     Medical: Not on file     Non-medical: Not on file   Tobacco Use    Smoking status: Never Smoker    Smokeless tobacco: Never Used   Substance and Sexual Activity    Alcohol use: Yes     Comment: occasional    Drug use: No    Sexual activity: Yes     Partners: Male     Birth control/protection: Pill   Lifestyle    Physical activity:     Days per week: Not on file     Minutes per session: Not on file    Stress: Not on file   Relationships    Social connections:     Talks on phone: Not on file     Gets together: Not on file     Attends Jewish service: Not on file     Active member of club or organization: Not on file     Attends meetings of clubs or organizations: Not on file     Relationship status: Not on file    Intimate partner violence:     Fear of current or ex partner: Not on file     Emotionally abused: Not on file     Physically abused: Not on file     Forced sexual activity: Not on file   Other Topics Concern    Not on file   Social History Narrative    Not on file      Family History   Problem Relation Age of Onset    Arthritis-osteo Mother     Arthritis-osteo Maternal Grandmother     Diabetes Maternal Grandmother     Cancer Maternal Grandmother         breast    Breast Cancer Other        Physical Exam:    Visit Vitals  /70 (BP 1 Location: Left arm, BP Patient Position: Sitting)   Pulse 80   Resp 16   Ht 5' 4\" (1.626 m)   Wt 148 lb (67.1 kg)   BMI 25.40 kg/m²      General: Well-appearing female no acute distress  HEENT: EOMI no scleral icterus is noted  Pulmonary: No increased work of breathing is noted  Extremities: Warm and well-perfused bilaterally no edema is noted bilaterally no varicosities are noted bilaterally no ulcerations are noted bilaterally  Neuro: Cranial nerves II through XII are grossly intact    Impression and Plan:  Timothy Ram is a 53 y.o. female with previously class III chronic venous insufficiency of the left lower extremity found to now have May Thurner syndrome. Overall this has fully resolved. She now has class 0 chronic venous insufficiency of the left lower extremity. Her ultrasound in clinic today shows no evidence of DVT good blood flow going through her venous stent and some pulsatility at the common femoral vein. Overall she is greatly improved. We will see her next year with a repeat ultrasound. I did let her know that her pain will slowly resolve. We reviewed the plan with the patient and the patient understands. We also gave the patient appropriate instructions on their disease process and when to call back. Greater than 50% of this visit was spent with face to face discussion. Follow-up and Dispositions    · Return in about 1 year (around 8/28/2020). Tim Camacho MD    PLEASE NOTE:  This document has been produced using voice recognition software. Unrecognized errors in transcription may be present.

## 2019-08-28 NOTE — TELEPHONE ENCOUNTER
----- Message from Moriah Thomas sent at 8/28/2019  9:51 AM EDT -----  Ms. Raulito Rudy would like you to give her a call. She has a question about her post op that she forgot to ask you.

## 2019-08-28 NOTE — PROGRESS NOTES
1. Have you been to an emergency room or urgent care clinic since your last visit? No      Hospitalized since your last visit? If yes, where, when, and reason for visit? NO  2. Have you seen or consulted any other health care providers outside of the [facility] since your last visit including any procedures, health maintenance items. If yes, where, when and reason for visit?  NO

## 2020-02-06 ENCOUNTER — TELEPHONE (OUTPATIENT)
Dept: VASCULAR SURGERY | Age: 50
End: 2020-02-06

## 2020-02-06 NOTE — TELEPHONE ENCOUNTER
Patient called yesterday complaining of left leg swelling/pain and back pain. Had ultrasound moved up and follow up.

## 2020-02-07 ENCOUNTER — TELEPHONE (OUTPATIENT)
Dept: VASCULAR SURGERY | Age: 50
End: 2020-02-07

## 2020-02-07 NOTE — TELEPHONE ENCOUNTER
Dr. Fernie Coyle reviewed patients recent ultrasound and I notified patient of results and patient still would like to keep appointment on Monday to discuss some more with Dr. Fernie Coyle.

## 2020-02-10 ENCOUNTER — OFFICE VISIT (OUTPATIENT)
Dept: VASCULAR SURGERY | Age: 50
End: 2020-02-10

## 2020-02-10 VITALS
OXYGEN SATURATION: 98 % | HEIGHT: 64 IN | DIASTOLIC BLOOD PRESSURE: 64 MMHG | HEART RATE: 83 BPM | SYSTOLIC BLOOD PRESSURE: 100 MMHG | WEIGHT: 148 LBS | BODY MASS INDEX: 25.27 KG/M2 | RESPIRATION RATE: 20 BRPM

## 2020-02-10 DIAGNOSIS — I87.1 MAY-THURNER SYNDROME: ICD-10-CM

## 2020-02-10 DIAGNOSIS — I83.92 SPIDER VEIN OF LEFT LOWER EXTREMITY: ICD-10-CM

## 2020-02-10 DIAGNOSIS — I87.2 CHRONIC VENOUS INSUFFICIENCY: Primary | ICD-10-CM

## 2020-02-10 NOTE — PROGRESS NOTES
[address] [place] [address]    Chief Complaint   Patient presents with    Leg Swelling       History and Physical    Suha Chacorta Chet Monson is a 51 y.o. 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 She states that she is getting increased swelling to the left lower extremity. She had a little bit of pain and discomfort where her stent is located although this is now since resolved. She has been attempting to wear her compression socks which keeps the swelling under control but she has noticed some increased swelling over these past few weeks. No venous claudication or ulcerations. No varicosities at this current time. Past Medical History:   Diagnosis Date    Kidney stone     Spider vein of left lower extremity 4/24/2019     Past Surgical History:   Procedure Laterality Date    HX BREAST LUMPECTOMY Left 4/30/2019    Left Retroareolar Duct Excision performed by Paola Hernández MD at [facility] MAIN OR    HX GYN      perineal repair    HX HEENT Bilateral     eye    HX ORTHOPAEDIC      left index;plastic surgery     Patient Active Problem List   Diagnosis Code    Bloody discharge from left nipple N64.52    Chronic venous insufficiency I87.2    Spider vein of left lower extremity I83.92    Intraductal papilloma of breast, left D24.2    May-Thurner syndrome I87.1     Current Outpatient Medications   Medication Sig Dispense Refill    aspirin delayed-release 81 mg tablet Take 1 Tab by mouth daily. 90 Tab prn    multivitamin (ONE A DAY) tablet Take 1 Tab by mouth daily.  omega 3-dha-epa-fish oil (FISH OIL) 100-160-1,000 mg cap Take  by mouth.        Allergies   Allergen Reactions    Hydrocodone Itching     Unable to sleep; itchy    Pcn [Penicillins] Rash     fever     Social History     Socioeconomic History    Marital status:      Spouse name: Not on file    Number of children: Not on file    Years of education: Not on file    Highest education level: Not on file   Occupational History  Not on file   Social Needs    Financial resource strain: Not on file    Food insecurity:     Worry: Not on file     Inability: Not on file    Transportation needs:     Medical: Not on file     Non-medical: Not on file   Tobacco Use    Smoking status: Never Smoker    Smokeless tobacco: Never Used   Substance and Sexual Activity    Alcohol use: Yes     Comment: occasional    Drug use: No    Sexual activity: Yes     Partners: Male     Birth control/protection: Pill   Lifestyle    Physical activity:     Days per week: Not on file     Minutes per session: Not on file    Stress: Not on file   Relationships    Social connections:     Talks on phone: Not on file     Gets together: Not on file     Attends Voodoo service: Not on file     Active member of club or organization: Not on file     Attends meetings of clubs or organizations: Not on file     Relationship status: Not on file    Intimate partner violence:     Fear of current or ex partner: Not on file     Emotionally abused: Not on file     Physically abused: Not on file     Forced sexual activity: Not on file   Other Topics Concern    Not on file   Social History Narrative    Not on file      Family History   Problem Relation Age of Onset    Arthritis-osteo Mother     Arthritis-osteo Maternal Grandmother     Diabetes Maternal Grandmother     Cancer Maternal Grandmother         breast    Breast Cancer Other        Physical Exam:    Visit Vitals  /64 (BP 1 Location: Left arm, BP Patient Position: Sitting)   Pulse 83   Resp 20   Ht 5' 4\" (1.626 m)   Wt 148 lb (67.1 kg)   SpO2 98%   BMI 25.40 kg/m²      General: Well-appearing female in no acute distress  HEENT: EOMI no scleral icterus is noted  Pulmonary: No increased work of breathing is noted  Extremities: Warm and perfused bilaterally no evidence of edema is noted in today's exam no ulcerations are identified and no skin changes are identified bilaterally  Neuro: Cranial nerves II through XII grossly intact normal mentation and speech    Impression and Plan:  Chucky Cárdenas is a 51 y.o. female with class III chronic venous insufficiency and known may Thurner to the left lower extremity. This has been repaired and mitigated with a left common iliac vein stent. I have reviewed her studies showing that the stent is patent and blood is flowing appropriately. I did let her know the only thing that we could do would be a reflux study to left lower extremity. I would be more than happy to do this if she felt that her swelling and discomfort was lifestyle limiting. At this current time she does not feel that is the case. I let her know that hopefully this is something that is temporary and will continue to improve. But if it does not and worsens then we would just bring her in earlier and do a reflux study. Otherwise we will continue to follow her with yearly ultrasounds of her venous stent. I did let her know that she does not need to come back in September for her previous yearly study that we will use this study as her new yearly study    We reviewed the plan with the patient and the patient understands. We also gave the patient appropriate instructions on their disease process and when to call back. Greater than 50% of this visit was spent with face to face discussion. Follow-up and Dispositions    · Return in about 1 year (around 2/10/2021). Francisco Magdaleno MD    PLEASE NOTE:  This document has been produced using voice recognition software. Unrecognized errors in transcription may be present.

## 2020-02-10 NOTE — PROGRESS NOTES
1. Have you been to the ER, urgent care clinic since your last visit? Hospitalized since your last visit? No    2. Have you seen or consulted any other health care providers outside of the [address] [place] [address] since your last visit? Include any pap smears or colon screening.  Yes Reason for visit: urology/ob-gyn;        3 most recent PHQ Screens 2/10/2020   Little interest or pleasure in doing things Not at all   Feeling down, depressed, irritable, or hopeless Not at all   Total Score PHQ 2 0

## 2020-08-31 ENCOUNTER — HOSPITAL ENCOUNTER (OUTPATIENT)
Dept: LAB | Age: 50
Discharge: HOME | End: 2020-08-31

## 2020-08-31 LAB — SENTARA SPECIMEN COL,SENBCF: NORMAL

## 2020-08-31 PROCEDURE — 99001 SPECIMEN HANDLING PT-LAB: CPT

## 2021-01-26 ENCOUNTER — TRANSCRIBE ORDER (OUTPATIENT)
Dept: SCHEDULING | Age: 51
End: 2021-01-26

## 2021-01-26 DIAGNOSIS — Z12.31 VISIT FOR SCREENING MAMMOGRAM: Primary | ICD-10-CM

## 2021-02-19 ENCOUNTER — HOSPITAL ENCOUNTER (OUTPATIENT)
Dept: MAMMOGRAPHY | Age: 51
Discharge: HOME | End: 2021-02-19
Attending: OBSTETRICS & GYNECOLOGY
Payer: COMMERCIAL

## 2021-02-19 DIAGNOSIS — Z12.31 VISIT FOR SCREENING MAMMOGRAM: ICD-10-CM

## 2021-02-19 PROCEDURE — 77063 BREAST TOMOSYNTHESIS BI: CPT

## 2022-03-18 PROBLEM — I87.1 MAY-THURNER SYNDROME: Status: ACTIVE | Noted: 2019-08-28

## 2022-03-18 PROBLEM — N64.52 BLOODY DISCHARGE FROM LEFT NIPPLE: Status: ACTIVE | Noted: 2019-04-10

## 2022-03-19 PROBLEM — I83.92 SPIDER VEIN OF LEFT LOWER EXTREMITY: Status: ACTIVE | Noted: 2019-04-24

## 2022-03-19 PROBLEM — I87.2 CHRONIC VENOUS INSUFFICIENCY: Status: ACTIVE | Noted: 2019-04-24

## 2022-03-19 PROBLEM — D24.2 INTRADUCTAL PAPILLOMA OF BREAST, LEFT: Status: ACTIVE | Noted: 2019-05-06

## 2022-08-02 ENCOUNTER — TRANSCRIBE ORDER (OUTPATIENT)
Dept: SCHEDULING | Age: 52
End: 2022-08-02

## 2022-08-02 DIAGNOSIS — Z12.31 VISIT FOR SCREENING MAMMOGRAM: Primary | ICD-10-CM

## 2022-08-04 ENCOUNTER — HOSPITAL ENCOUNTER (OUTPATIENT)
Dept: WOMENS IMAGING | Age: 52
Discharge: HOME | End: 2022-08-04
Attending: OBSTETRICS & GYNECOLOGY
Payer: COMMERCIAL

## 2022-08-04 DIAGNOSIS — Z12.31 VISIT FOR SCREENING MAMMOGRAM: ICD-10-CM

## 2022-08-04 PROCEDURE — 77063 BREAST TOMOSYNTHESIS BI: CPT

## 2022-08-10 ENCOUNTER — HOSPITAL ENCOUNTER (OUTPATIENT)
Dept: ULTRASOUND IMAGING | Age: 52
Discharge: HOME | End: 2022-08-10
Attending: OBSTETRICS & GYNECOLOGY
Payer: COMMERCIAL

## 2022-08-10 ENCOUNTER — HOSPITAL ENCOUNTER (OUTPATIENT)
Dept: WOMENS IMAGING | Age: 52
Discharge: HOME | End: 2022-08-10
Attending: OBSTETRICS & GYNECOLOGY
Payer: COMMERCIAL

## 2022-08-10 DIAGNOSIS — R92.8 ABNORMAL FINDING ON BREAST IMAGING: ICD-10-CM

## 2022-08-10 PROCEDURE — 77061 BREAST TOMOSYNTHESIS UNI: CPT

## 2022-08-10 PROCEDURE — 76642 ULTRASOUND BREAST LIMITED: CPT

## 2024-05-16 NOTE — TELEPHONE ENCOUNTER
Returned patients phone call and patient explained that she had pain immediately after surgery and expected that but now a week later she is still have constant back pain that she rates at 5-8 on pain scale and is unable to sit for long periods or ride in car for long periods or lay a certain way in bed due to the fact that the pain increases. The pain is to left lower back/hip area and some to right hip area. She is also concerned because on Monday her left leg become very swollen and has now reduced but still having the swelling. She states the swelling does go down more after  She has laid in the bed at night. She is taking 800 mg of Ibuprofen on a schedule. Initially she did take the narcotic but felt the Ibuprofen helped more than narcotic. Explained to patient that this is normal after this procedure and with having stent placed and Ibuprofen is better to take but also to try warm compresses to lower back to help reduce pain and inflammation as well and that each week the  Pain should get much better. Discussed with Dr. Meghana Stanley who agrees with plan but also would like patient to have a acute venous ultrasound of left leg and left side IVC ultrasound prior to her appointment next Wednesday. I have informed patient of this and will have PSR contact her with time and date of ultrasound. behavioral health

## (undated) DEVICE — SOLUTION IV 1000ML 0.9% SOD CHL

## (undated) DEVICE — PAD,NON-ADHERENT,3X8,STERILE,LF,1/PK: Brand: MEDLINE

## (undated) DEVICE — ZINACTIVE USE 2641837 CLIP LIG M BLU TI HRT SHP WIRE HORZ 600 PER BX

## (undated) DEVICE — INTRODUCER SHTH 8FR CANN L11CM DIL TIP 35MM BLU TUNGSTEN

## (undated) DEVICE — Device: Brand: VISIONS PV .035 DIGITAL IVUS CATHETER

## (undated) DEVICE — SYR 10ML CTRL LR LCK NSAF LF --

## (undated) DEVICE — ATLAS® GOLD PTA DILATATION CATHETER 16 MM X 40 MM, 80 CM CATHETER: Brand: ATLAS® GOLD

## (undated) DEVICE — HEX-LOCKING BLADE ELECTRODE: Brand: EDGE

## (undated) DEVICE — COVER US PRB W12XL244CM FLD IORT STR TIP

## (undated) DEVICE — SUT SLK 2-0SH 30IN BLK --

## (undated) DEVICE — PACK PROCEDURE SURG VASC CATH 161 MMC LF

## (undated) DEVICE — DRAPE,SPLIT ,77X120: Brand: MEDLINE

## (undated) DEVICE — DEPAUL BREAST PLASTIC PACK: Brand: MEDLINE INDUSTRIES, INC.

## (undated) DEVICE — (D)PREP SKN CHLRAPRP APPL 26ML -- CONVERT TO ITEM 371833

## (undated) DEVICE — SUTURE VCRL SZ 3-0 L27IN ABSRB UD L26MM SH 1/2 CIR J416H

## (undated) DEVICE — TOWEL SURG W16XL26IN BLU NONFENESTRATED DLX ST 2 PER PK

## (undated) DEVICE — CLIP INT SM WIDE RED TI TRNSVRS GRV CHEVRON SHP W PRECIS

## (undated) DEVICE — NDL PRT INJ NSAF BLNT 18GX1.5 --

## (undated) DEVICE — SET FLD ADMIN 3 W STPCOCK FIX FEM L BOR 1IN

## (undated) DEVICE — ANGIOGRAPHY KIT CUST VASC

## (undated) DEVICE — SUT MONOCRYL PLUS UD 4-0 --

## (undated) DEVICE — ADHESIVE TISS CLOSURE 22X4 CM 4 CC HI VISC EXOFIN

## (undated) DEVICE — SUTURE ABSORBABLE BRAIDED 2-0 CT-1 27 IN UD VICRYL J259H

## (undated) DEVICE — INTRODUCER SHTH 10FR CANN L11CM DIL TIP 45MM FUCHSIA

## (undated) DEVICE — KENDALL SCD EXPRESS SLEEVES, KNEE LENGTH, MEDIUM: Brand: KENDALL SCD

## (undated) DEVICE — COVER US PRB W15XL120CM W/ GEL RUBBERBAND TAPE STRP FLD GEN